# Patient Record
Sex: MALE | Race: WHITE | NOT HISPANIC OR LATINO | URBAN - METROPOLITAN AREA
[De-identification: names, ages, dates, MRNs, and addresses within clinical notes are randomized per-mention and may not be internally consistent; named-entity substitution may affect disease eponyms.]

---

## 2019-01-18 ENCOUNTER — OFFICE (OUTPATIENT)
Dept: URBAN - METROPOLITAN AREA CLINIC 64 | Facility: CLINIC | Age: 44
End: 2019-01-18

## 2019-01-18 VITALS
SYSTOLIC BLOOD PRESSURE: 165 MMHG | HEIGHT: 72 IN | HEART RATE: 82 BPM | WEIGHT: 172 LBS | DIASTOLIC BLOOD PRESSURE: 112 MMHG

## 2019-01-18 DIAGNOSIS — K59.00 CONSTIPATION, UNSPECIFIED: ICD-10-CM

## 2019-01-18 DIAGNOSIS — F10.10 ALCOHOL ABUSE, UNCOMPLICATED: ICD-10-CM

## 2019-01-18 DIAGNOSIS — K21.9 GASTRO-ESOPHAGEAL REFLUX DISEASE WITHOUT ESOPHAGITIS: ICD-10-CM

## 2019-01-18 DIAGNOSIS — R07.2 PRECORDIAL PAIN: ICD-10-CM

## 2019-01-18 DIAGNOSIS — K85.20 ALCOHOL INDUCED ACUTE PANCREATITIS WITHOUT NECROSIS OR INFEC: ICD-10-CM

## 2019-01-18 DIAGNOSIS — K25.9 GASTRIC ULCER, UNSPECIFIED AS ACUTE OR CHRONIC, WITHOUT HEMO: ICD-10-CM

## 2019-01-18 DIAGNOSIS — R11.2 NAUSEA WITH VOMITING, UNSPECIFIED: ICD-10-CM

## 2019-01-18 PROCEDURE — 99203 OFFICE O/P NEW LOW 30 MIN: CPT | Performed by: NURSE PRACTITIONER

## 2019-01-18 RX ORDER — RANITIDINE 150 MG/1
TABLET ORAL
Qty: 60 | Status: COMPLETED
End: 2019-01-18

## 2019-01-18 RX ORDER — ESCITALOPRAM 10 MG/1
TABLET, FILM COATED ORAL
Qty: 30 | Status: COMPLETED
End: 2019-01-18

## 2019-01-18 RX ORDER — SUCRALFATE 1 G/1
2 TABLET ORAL
Qty: 180 | Refills: 3 | Status: ACTIVE
Start: 2019-01-18

## 2019-01-18 RX ORDER — DOCUSATE SODIUM 100 MG/1
100 CAPSULE ORAL
Qty: 60 | Refills: 11 | Status: ACTIVE
Start: 2019-01-18

## 2019-01-18 RX ORDER — SUCRALFATE 1 G/1
TABLET ORAL
Qty: 60 | Status: COMPLETED
End: 2019-01-18

## 2019-01-18 RX ORDER — PANTOPRAZOLE SODIUM 40 MG/1
40 TABLET, DELAYED RELEASE ORAL
Qty: 90 | Refills: 3 | Status: COMPLETED
Start: 2019-01-18 | End: 2019-05-31

## 2019-03-15 ENCOUNTER — OFFICE (OUTPATIENT)
Dept: URBAN - METROPOLITAN AREA CLINIC 64 | Facility: CLINIC | Age: 44
End: 2019-03-15

## 2019-03-15 VITALS
HEART RATE: 96 BPM | DIASTOLIC BLOOD PRESSURE: 118 MMHG | WEIGHT: 180 LBS | HEIGHT: 72 IN | SYSTOLIC BLOOD PRESSURE: 158 MMHG

## 2019-03-15 DIAGNOSIS — R11.2 NAUSEA WITH VOMITING, UNSPECIFIED: ICD-10-CM

## 2019-03-15 DIAGNOSIS — R07.9 CHEST PAIN, UNSPECIFIED: ICD-10-CM

## 2019-03-15 DIAGNOSIS — K85.20 ALCOHOL INDUCED ACUTE PANCREATITIS WITHOUT NECROSIS OR INFEC: ICD-10-CM

## 2019-03-15 DIAGNOSIS — K21.9 GASTRO-ESOPHAGEAL REFLUX DISEASE WITHOUT ESOPHAGITIS: ICD-10-CM

## 2019-03-15 PROCEDURE — 99213 OFFICE O/P EST LOW 20 MIN: CPT | Performed by: NURSE PRACTITIONER

## 2019-03-15 RX ORDER — ESOMEPRAZOLE MAGNESIUM 40 MG/1
CAPSULE, DELAYED RELEASE ORAL
Qty: 90 | Refills: 0 | Status: COMPLETED
End: 2019-03-15

## 2019-05-31 ENCOUNTER — OFFICE (OUTPATIENT)
Dept: URBAN - METROPOLITAN AREA CLINIC 64 | Facility: CLINIC | Age: 44
End: 2019-05-31

## 2019-05-31 VITALS
SYSTOLIC BLOOD PRESSURE: 89 MMHG | DIASTOLIC BLOOD PRESSURE: 50 MMHG | WEIGHT: 177 LBS | HEART RATE: 83 BPM | HEIGHT: 72 IN

## 2019-05-31 DIAGNOSIS — R11.2 NAUSEA WITH VOMITING, UNSPECIFIED: ICD-10-CM

## 2019-05-31 DIAGNOSIS — R07.9 CHEST PAIN, UNSPECIFIED: ICD-10-CM

## 2019-05-31 DIAGNOSIS — K21.9 GASTRO-ESOPHAGEAL REFLUX DISEASE WITHOUT ESOPHAGITIS: ICD-10-CM

## 2019-05-31 PROCEDURE — 99214 OFFICE O/P EST MOD 30 MIN: CPT | Performed by: NURSE PRACTITIONER

## 2019-05-31 RX ORDER — DEXLANSOPRAZOLE 60 MG/1
60 CAPSULE, DELAYED RELEASE ORAL
Qty: 90 | Refills: 3 | Status: ACTIVE
Start: 2019-05-31

## 2019-05-31 RX ORDER — PANTOPRAZOLE SODIUM 40 MG/1
40 TABLET, DELAYED RELEASE ORAL
Qty: 90 | Refills: 3 | Status: COMPLETED
Start: 2019-01-18 | End: 2019-05-31

## 2019-05-31 RX ORDER — SUCRALFATE 1 G/1
2 TABLET ORAL
Qty: 180 | Refills: 3 | Status: ACTIVE
Start: 2019-01-18

## 2019-07-08 ENCOUNTER — INPATIENT HOSPITAL (OUTPATIENT)
Dept: URBAN - METROPOLITAN AREA HOSPITAL 76 | Facility: HOSPITAL | Age: 44
End: 2019-07-08

## 2019-07-08 DIAGNOSIS — R10.10 UPPER ABDOMINAL PAIN, UNSPECIFIED: ICD-10-CM

## 2019-07-08 DIAGNOSIS — F10.10 ALCOHOL ABUSE, UNCOMPLICATED: ICD-10-CM

## 2019-07-08 DIAGNOSIS — D72.829 ELEVATED WHITE BLOOD CELL COUNT, UNSPECIFIED: ICD-10-CM

## 2019-07-08 DIAGNOSIS — R11.2 NAUSEA WITH VOMITING, UNSPECIFIED: ICD-10-CM

## 2019-07-08 DIAGNOSIS — K85.90 ACUTE PANCREATITIS WITHOUT NECROSIS OR INFECTION, UNSPECIFIE: ICD-10-CM

## 2019-07-08 PROCEDURE — 99222 1ST HOSP IP/OBS MODERATE 55: CPT | Performed by: NURSE PRACTITIONER

## 2019-07-09 PROCEDURE — 99232 SBSQ HOSP IP/OBS MODERATE 35: CPT | Performed by: NURSE PRACTITIONER

## 2019-07-10 ENCOUNTER — INPATIENT HOSPITAL (OUTPATIENT)
Dept: URBAN - METROPOLITAN AREA HOSPITAL 76 | Facility: HOSPITAL | Age: 44
End: 2019-07-10

## 2019-07-10 DIAGNOSIS — D72.829 ELEVATED WHITE BLOOD CELL COUNT, UNSPECIFIED: ICD-10-CM

## 2019-07-10 DIAGNOSIS — R11.2 NAUSEA WITH VOMITING, UNSPECIFIED: ICD-10-CM

## 2019-07-10 DIAGNOSIS — F10.188 ALCOHOL ABUSE WITH OTHER ALCOHOL-INDUCED DISORDER: ICD-10-CM

## 2019-07-10 DIAGNOSIS — K85.20 ALCOHOL INDUCED ACUTE PANCREATITIS WITHOUT NECROSIS OR INFEC: ICD-10-CM

## 2019-07-10 DIAGNOSIS — R10.10 UPPER ABDOMINAL PAIN, UNSPECIFIED: ICD-10-CM

## 2019-07-10 PROCEDURE — 99231 SBSQ HOSP IP/OBS SF/LOW 25: CPT | Performed by: NURSE PRACTITIONER

## 2019-10-18 ENCOUNTER — OFFICE (OUTPATIENT)
Dept: URBAN - METROPOLITAN AREA CLINIC 64 | Facility: CLINIC | Age: 44
End: 2019-10-18
Payer: COMMERCIAL

## 2019-10-18 VITALS
WEIGHT: 183 LBS | DIASTOLIC BLOOD PRESSURE: 93 MMHG | SYSTOLIC BLOOD PRESSURE: 136 MMHG | HEART RATE: 92 BPM | HEIGHT: 72 IN

## 2019-10-18 DIAGNOSIS — K21.9 GASTRO-ESOPHAGEAL REFLUX DISEASE WITHOUT ESOPHAGITIS: ICD-10-CM

## 2019-10-18 DIAGNOSIS — F10.10 ALCOHOL ABUSE, UNCOMPLICATED: ICD-10-CM

## 2019-10-18 DIAGNOSIS — K85.20 ALCOHOL INDUCED ACUTE PANCREATITIS WITHOUT NECROSIS OR INFEC: ICD-10-CM

## 2019-10-18 PROCEDURE — 99214 OFFICE O/P EST MOD 30 MIN: CPT | Performed by: NURSE PRACTITIONER

## 2019-10-18 RX ORDER — SUCRALFATE 1 G/1
2 TABLET ORAL
Qty: 180 | Refills: 3 | Status: ACTIVE
Start: 2019-01-18

## 2019-10-18 RX ORDER — ESOMEPRAZOLE MAGNESIUM 40 MG/1
40 CAPSULE, DELAYED RELEASE ORAL
Qty: 90 | Refills: 5 | Status: COMPLETED
End: 2021-02-03

## 2019-10-18 RX ORDER — DEXLANSOPRAZOLE 60 MG/1
60 CAPSULE, DELAYED RELEASE ORAL
Qty: 90 | Refills: 3 | Status: ACTIVE
Start: 2019-05-31

## 2020-02-21 ENCOUNTER — OFFICE (OUTPATIENT)
Dept: URBAN - METROPOLITAN AREA CLINIC 64 | Facility: CLINIC | Age: 45
End: 2020-02-21

## 2020-02-21 VITALS
HEIGHT: 72 IN | WEIGHT: 183 LBS | HEART RATE: 95 BPM | DIASTOLIC BLOOD PRESSURE: 83 MMHG | SYSTOLIC BLOOD PRESSURE: 118 MMHG

## 2020-02-21 DIAGNOSIS — R10.10 UPPER ABDOMINAL PAIN, UNSPECIFIED: ICD-10-CM

## 2020-02-21 DIAGNOSIS — F10.10 ALCOHOL ABUSE, UNCOMPLICATED: ICD-10-CM

## 2020-02-21 DIAGNOSIS — D50.9 IRON DEFICIENCY ANEMIA, UNSPECIFIED: ICD-10-CM

## 2020-02-21 DIAGNOSIS — K21.9 GASTRO-ESOPHAGEAL REFLUX DISEASE WITHOUT ESOPHAGITIS: ICD-10-CM

## 2020-02-21 DIAGNOSIS — R11.2 NAUSEA WITH VOMITING, UNSPECIFIED: ICD-10-CM

## 2020-02-21 PROCEDURE — 99213 OFFICE O/P EST LOW 20 MIN: CPT | Performed by: NURSE PRACTITIONER

## 2020-02-21 RX ORDER — ONDANSETRON HYDROCHLORIDE 4 MG/1
TABLET, FILM COATED ORAL
Qty: 21 | Refills: 3 | Status: ACTIVE

## 2020-03-17 ENCOUNTER — INPATIENT HOSPITAL (OUTPATIENT)
Dept: URBAN - METROPOLITAN AREA HOSPITAL 76 | Facility: HOSPITAL | Age: 45
End: 2020-03-17
Payer: MEDICAID

## 2020-03-17 ENCOUNTER — INPATIENT HOSPITAL (OUTPATIENT)
Dept: URBAN - METROPOLITAN AREA HOSPITAL 76 | Facility: HOSPITAL | Age: 45
End: 2020-03-17

## 2020-03-17 DIAGNOSIS — K20.9 ESOPHAGITIS, UNSPECIFIED: ICD-10-CM

## 2020-03-17 DIAGNOSIS — D72.829 ELEVATED WHITE BLOOD CELL COUNT, UNSPECIFIED: ICD-10-CM

## 2020-03-17 DIAGNOSIS — K31.5 OBSTRUCTION OF DUODENUM: ICD-10-CM

## 2020-03-17 DIAGNOSIS — K25.9 GASTRIC ULCER, UNSPECIFIED AS ACUTE OR CHRONIC, WITHOUT HEMO: ICD-10-CM

## 2020-03-17 DIAGNOSIS — D50.9 IRON DEFICIENCY ANEMIA, UNSPECIFIED: ICD-10-CM

## 2020-03-17 DIAGNOSIS — D50.0 IRON DEFICIENCY ANEMIA SECONDARY TO BLOOD LOSS (CHRONIC): ICD-10-CM

## 2020-03-17 DIAGNOSIS — K29.50 UNSPECIFIED CHRONIC GASTRITIS WITHOUT BLEEDING: ICD-10-CM

## 2020-03-17 DIAGNOSIS — R94.5 ABNORMAL RESULTS OF LIVER FUNCTION STUDIES: ICD-10-CM

## 2020-03-17 DIAGNOSIS — K85.91 ACUTE PANCREATITIS WITH UNINFECTED NECROSIS, UNSPECIFIED: ICD-10-CM

## 2020-03-17 DIAGNOSIS — R10.12 LEFT UPPER QUADRANT PAIN: ICD-10-CM

## 2020-03-17 PROCEDURE — 99222 1ST HOSP IP/OBS MODERATE 55: CPT | Mod: 25 | Performed by: NURSE PRACTITIONER

## 2020-03-17 PROCEDURE — 43239 EGD BIOPSY SINGLE/MULTIPLE: CPT | Performed by: INTERNAL MEDICINE

## 2020-03-18 PROCEDURE — 99232 SBSQ HOSP IP/OBS MODERATE 35: CPT | Performed by: NURSE PRACTITIONER

## 2020-04-15 ENCOUNTER — INPATIENT HOSPITAL (OUTPATIENT)
Dept: URBAN - METROPOLITAN AREA HOSPITAL 76 | Facility: HOSPITAL | Age: 45
End: 2020-04-15

## 2020-04-15 DIAGNOSIS — R74.8 ABNORMAL LEVELS OF OTHER SERUM ENZYMES: ICD-10-CM

## 2020-04-15 DIAGNOSIS — I81 PORTAL VEIN THROMBOSIS: ICD-10-CM

## 2020-04-15 DIAGNOSIS — R10.13 EPIGASTRIC PAIN: ICD-10-CM

## 2020-04-15 DIAGNOSIS — K85.20 ALCOHOL INDUCED ACUTE PANCREATITIS WITHOUT NECROSIS OR INFEC: ICD-10-CM

## 2020-04-15 PROCEDURE — 99222 1ST HOSP IP/OBS MODERATE 55: CPT | Performed by: INTERNAL MEDICINE

## 2020-04-16 PROCEDURE — 99232 SBSQ HOSP IP/OBS MODERATE 35: CPT | Performed by: INTERNAL MEDICINE

## 2020-04-17 PROCEDURE — 99231 SBSQ HOSP IP/OBS SF/LOW 25: CPT | Performed by: INTERNAL MEDICINE

## 2020-04-18 PROCEDURE — 99231 SBSQ HOSP IP/OBS SF/LOW 25: CPT | Performed by: INTERNAL MEDICINE

## 2020-06-05 ENCOUNTER — INPATIENT HOSPITAL (OUTPATIENT)
Dept: URBAN - METROPOLITAN AREA HOSPITAL 76 | Facility: HOSPITAL | Age: 45
End: 2020-06-05

## 2020-06-05 DIAGNOSIS — F17.210 NICOTINE DEPENDENCE, CIGARETTES, UNCOMPLICATED: ICD-10-CM

## 2020-06-05 DIAGNOSIS — R11.2 NAUSEA WITH VOMITING, UNSPECIFIED: ICD-10-CM

## 2020-06-05 DIAGNOSIS — K70.11 ALCOHOLIC HEPATITIS WITH ASCITES: ICD-10-CM

## 2020-06-05 DIAGNOSIS — E87.6 HYPOKALEMIA: ICD-10-CM

## 2020-06-05 DIAGNOSIS — R94.5 ABNORMAL RESULTS OF LIVER FUNCTION STUDIES: ICD-10-CM

## 2020-06-05 DIAGNOSIS — F31.30: ICD-10-CM

## 2020-06-05 DIAGNOSIS — F10.239 ALCOHOL DEPENDENCE WITH WITHDRAWAL, UNSPECIFIED: ICD-10-CM

## 2020-06-05 DIAGNOSIS — K86.0 ALCOHOL-INDUCED CHRONIC PANCREATITIS: ICD-10-CM

## 2020-06-05 DIAGNOSIS — Z90.49 ACQUIRED ABSENCE OF OTHER SPECIFIED PARTS OF DIGESTIVE TRACT: ICD-10-CM

## 2020-06-05 DIAGNOSIS — K85.20 ALCOHOL INDUCED ACUTE PANCREATITIS WITHOUT NECROSIS OR INFEC: ICD-10-CM

## 2020-06-05 PROCEDURE — 99222 1ST HOSP IP/OBS MODERATE 55: CPT | Performed by: NURSE PRACTITIONER

## 2020-06-17 ENCOUNTER — INPATIENT HOSPITAL (OUTPATIENT)
Dept: URBAN - METROPOLITAN AREA HOSPITAL 76 | Facility: HOSPITAL | Age: 45
End: 2020-06-17

## 2020-06-17 DIAGNOSIS — K31.5 OBSTRUCTION OF DUODENUM: ICD-10-CM

## 2020-06-17 DIAGNOSIS — K86.1 OTHER CHRONIC PANCREATITIS: ICD-10-CM

## 2020-06-17 DIAGNOSIS — E46 UNSPECIFIED PROTEIN-CALORIE MALNUTRITION: ICD-10-CM

## 2020-06-17 DIAGNOSIS — F10.239 ALCOHOL DEPENDENCE WITH WITHDRAWAL, UNSPECIFIED: ICD-10-CM

## 2020-06-17 PROCEDURE — 43241 EGD TUBE/CATH INSERTION: CPT | Performed by: INTERNAL MEDICINE

## 2020-07-08 ENCOUNTER — INPATIENT HOSPITAL (OUTPATIENT)
Dept: URBAN - METROPOLITAN AREA HOSPITAL 76 | Facility: HOSPITAL | Age: 45
End: 2020-07-08

## 2020-07-08 DIAGNOSIS — F10.10 ALCOHOL ABUSE, UNCOMPLICATED: ICD-10-CM

## 2020-07-08 DIAGNOSIS — K86.1 OTHER CHRONIC PANCREATITIS: ICD-10-CM

## 2020-07-08 DIAGNOSIS — R94.5 ABNORMAL RESULTS OF LIVER FUNCTION STUDIES: ICD-10-CM

## 2020-07-08 DIAGNOSIS — R10.9 UNSPECIFIED ABDOMINAL PAIN: ICD-10-CM

## 2020-07-08 DIAGNOSIS — K85.90 ACUTE PANCREATITIS WITHOUT NECROSIS OR INFECTION, UNSPECIFIE: ICD-10-CM

## 2020-07-08 DIAGNOSIS — R11.2 NAUSEA WITH VOMITING, UNSPECIFIED: ICD-10-CM

## 2020-07-08 PROCEDURE — 99223 1ST HOSP IP/OBS HIGH 75: CPT | Performed by: NURSE PRACTITIONER

## 2020-07-09 PROCEDURE — 99232 SBSQ HOSP IP/OBS MODERATE 35: CPT | Performed by: NURSE PRACTITIONER

## 2020-07-10 ENCOUNTER — INPATIENT HOSPITAL (OUTPATIENT)
Dept: URBAN - METROPOLITAN AREA HOSPITAL 76 | Facility: HOSPITAL | Age: 45
End: 2020-07-10

## 2020-07-10 DIAGNOSIS — I86.4 GASTRIC VARICES: ICD-10-CM

## 2020-07-10 DIAGNOSIS — K92.1 MELENA: ICD-10-CM

## 2020-07-10 DIAGNOSIS — R93.3 ABNORMAL FINDINGS ON DIAGNOSTIC IMAGING OF OTHER PARTS OF DI: ICD-10-CM

## 2020-07-10 PROCEDURE — 43235 EGD DIAGNOSTIC BRUSH WASH: CPT | Performed by: INTERNAL MEDICINE

## 2020-08-28 ENCOUNTER — INPATIENT HOSPITAL (OUTPATIENT)
Dept: URBAN - METROPOLITAN AREA HOSPITAL 76 | Facility: HOSPITAL | Age: 45
End: 2020-08-28

## 2020-08-28 DIAGNOSIS — D50.0 IRON DEFICIENCY ANEMIA SECONDARY TO BLOOD LOSS (CHRONIC): ICD-10-CM

## 2020-08-28 DIAGNOSIS — K29.50 UNSPECIFIED CHRONIC GASTRITIS WITHOUT BLEEDING: ICD-10-CM

## 2020-08-28 DIAGNOSIS — R10.13 EPIGASTRIC PAIN: ICD-10-CM

## 2020-08-28 PROCEDURE — 43239 EGD BIOPSY SINGLE/MULTIPLE: CPT | Performed by: INTERNAL MEDICINE

## 2020-08-29 ENCOUNTER — INPATIENT HOSPITAL (OUTPATIENT)
Dept: URBAN - METROPOLITAN AREA HOSPITAL 76 | Facility: HOSPITAL | Age: 45
End: 2020-08-29

## 2020-08-29 DIAGNOSIS — K64.4 RESIDUAL HEMORRHOIDAL SKIN TAGS: ICD-10-CM

## 2020-08-29 DIAGNOSIS — R10.10 UPPER ABDOMINAL PAIN, UNSPECIFIED: ICD-10-CM

## 2020-08-29 DIAGNOSIS — F19.188: ICD-10-CM

## 2020-08-29 DIAGNOSIS — K62.5 HEMORRHAGE OF ANUS AND RECTUM: ICD-10-CM

## 2020-08-29 DIAGNOSIS — F10.188 ALCOHOL ABUSE WITH OTHER ALCOHOL-INDUCED DISORDER: ICD-10-CM

## 2020-08-29 DIAGNOSIS — K64.8 OTHER HEMORRHOIDS: ICD-10-CM

## 2020-08-29 DIAGNOSIS — K57.30 DIVERTICULOSIS OF LARGE INTESTINE WITHOUT PERFORATION OR ABS: ICD-10-CM

## 2020-08-29 DIAGNOSIS — D12.3 BENIGN NEOPLASM OF TRANSVERSE COLON: ICD-10-CM

## 2020-08-29 PROCEDURE — 45385 COLONOSCOPY W/LESION REMOVAL: CPT | Performed by: INTERNAL MEDICINE

## 2020-08-30 PROCEDURE — 99232 SBSQ HOSP IP/OBS MODERATE 35: CPT | Performed by: INTERNAL MEDICINE

## 2020-09-15 ENCOUNTER — INPATIENT HOSPITAL (OUTPATIENT)
Dept: URBAN - METROPOLITAN AREA HOSPITAL 76 | Facility: HOSPITAL | Age: 45
End: 2020-09-15

## 2020-09-15 DIAGNOSIS — K86.1 OTHER CHRONIC PANCREATITIS: ICD-10-CM

## 2020-09-15 DIAGNOSIS — F19.10 OTHER PSYCHOACTIVE SUBSTANCE ABUSE, UNCOMPLICATED: ICD-10-CM

## 2020-09-15 DIAGNOSIS — R94.5 ABNORMAL RESULTS OF LIVER FUNCTION STUDIES: ICD-10-CM

## 2020-09-15 DIAGNOSIS — D50.9 IRON DEFICIENCY ANEMIA, UNSPECIFIED: ICD-10-CM

## 2020-09-15 DIAGNOSIS — R93.3 ABNORMAL FINDINGS ON DIAGNOSTIC IMAGING OF OTHER PARTS OF DI: ICD-10-CM

## 2020-09-15 DIAGNOSIS — F10.10 ALCOHOL ABUSE, UNCOMPLICATED: ICD-10-CM

## 2020-09-15 DIAGNOSIS — R11.2 NAUSEA WITH VOMITING, UNSPECIFIED: ICD-10-CM

## 2020-09-15 PROCEDURE — 99221 1ST HOSP IP/OBS SF/LOW 40: CPT | Performed by: INTERNAL MEDICINE

## 2020-09-16 ENCOUNTER — INPATIENT HOSPITAL (OUTPATIENT)
Dept: URBAN - METROPOLITAN AREA HOSPITAL 76 | Facility: HOSPITAL | Age: 45
End: 2020-09-16
Payer: COMMERCIAL

## 2020-09-16 DIAGNOSIS — F10.10 ALCOHOL ABUSE, UNCOMPLICATED: ICD-10-CM

## 2020-09-16 DIAGNOSIS — D50.9 IRON DEFICIENCY ANEMIA, UNSPECIFIED: ICD-10-CM

## 2020-09-16 DIAGNOSIS — R19.7 DIARRHEA, UNSPECIFIED: ICD-10-CM

## 2020-09-16 DIAGNOSIS — R10.13 EPIGASTRIC PAIN: ICD-10-CM

## 2020-09-16 DIAGNOSIS — R74.8 ABNORMAL LEVELS OF OTHER SERUM ENZYMES: ICD-10-CM

## 2020-09-16 DIAGNOSIS — R10.12 LEFT UPPER QUADRANT PAIN: ICD-10-CM

## 2020-09-16 DIAGNOSIS — R11.2 NAUSEA WITH VOMITING, UNSPECIFIED: ICD-10-CM

## 2020-09-16 PROCEDURE — 99221 1ST HOSP IP/OBS SF/LOW 40: CPT | Performed by: INTERNAL MEDICINE

## 2021-02-03 ENCOUNTER — OFFICE (OUTPATIENT)
Dept: URBAN - METROPOLITAN AREA CLINIC 64 | Facility: CLINIC | Age: 46
End: 2021-02-03

## 2021-02-03 VITALS
SYSTOLIC BLOOD PRESSURE: 97 MMHG | HEIGHT: 72 IN | WEIGHT: 139 LBS | HEART RATE: 95 BPM | DIASTOLIC BLOOD PRESSURE: 74 MMHG

## 2021-02-03 DIAGNOSIS — D75.89 OTHER SPECIFIED DISEASES OF BLOOD AND BLOOD-FORMING ORGANS: ICD-10-CM

## 2021-02-03 DIAGNOSIS — K21.9 GASTRO-ESOPHAGEAL REFLUX DISEASE WITHOUT ESOPHAGITIS: ICD-10-CM

## 2021-02-03 DIAGNOSIS — R74.8 ABNORMAL LEVELS OF OTHER SERUM ENZYMES: ICD-10-CM

## 2021-02-03 DIAGNOSIS — D50.9 IRON DEFICIENCY ANEMIA, UNSPECIFIED: ICD-10-CM

## 2021-02-03 DIAGNOSIS — K85.20 ALCOHOL INDUCED ACUTE PANCREATITIS WITHOUT NECROSIS OR INFEC: ICD-10-CM

## 2021-02-03 PROCEDURE — 99214 OFFICE O/P EST MOD 30 MIN: CPT | Performed by: NURSE PRACTITIONER

## 2021-02-03 RX ORDER — SUCRALFATE 1 G/1
2 TABLET ORAL
Qty: 180 | Refills: 3 | Status: ACTIVE
Start: 2019-01-18

## 2021-02-03 RX ORDER — DEXLANSOPRAZOLE 60 MG/1
60 CAPSULE, DELAYED RELEASE ORAL
Qty: 90 | Refills: 3 | Status: ACTIVE
Start: 2019-05-31

## 2021-02-13 ENCOUNTER — ON CAMPUS - OUTPATIENT (OUTPATIENT)
Dept: URBAN - METROPOLITAN AREA HOSPITAL 77 | Facility: HOSPITAL | Age: 46
End: 2021-02-13
Payer: MEDICAID

## 2021-02-13 DIAGNOSIS — R10.13 EPIGASTRIC PAIN: ICD-10-CM

## 2021-02-13 DIAGNOSIS — F10.10 ALCOHOL ABUSE, UNCOMPLICATED: ICD-10-CM

## 2021-02-13 DIAGNOSIS — Z87.11 PERSONAL HISTORY OF PEPTIC ULCER DISEASE: ICD-10-CM

## 2021-02-13 DIAGNOSIS — D50.9 IRON DEFICIENCY ANEMIA, UNSPECIFIED: ICD-10-CM

## 2021-02-13 PROCEDURE — 99212 OFFICE O/P EST SF 10 MIN: CPT | Performed by: INTERNAL MEDICINE

## 2021-03-09 ENCOUNTER — ON CAMPUS - OUTPATIENT (OUTPATIENT)
Dept: URBAN - METROPOLITAN AREA HOSPITAL 77 | Facility: HOSPITAL | Age: 46
End: 2021-03-09

## 2021-03-09 DIAGNOSIS — D64.9 ANEMIA, UNSPECIFIED: ICD-10-CM

## 2021-03-09 DIAGNOSIS — K25.9 GASTRIC ULCER, UNSPECIFIED AS ACUTE OR CHRONIC, WITHOUT HEMO: ICD-10-CM

## 2021-03-09 DIAGNOSIS — K64.8 OTHER HEMORRHOIDS: ICD-10-CM

## 2021-03-09 DIAGNOSIS — K26.9 DUODENAL ULCER, UNSPECIFIED AS ACUTE OR CHRONIC, WITHOUT HEM: ICD-10-CM

## 2021-03-09 DIAGNOSIS — K57.90 DIVERTICULOSIS OF INTESTINE, PART UNSPECIFIED, WITHOUT PERFO: ICD-10-CM

## 2021-03-09 PROCEDURE — 45378 DIAGNOSTIC COLONOSCOPY: CPT | Performed by: INTERNAL MEDICINE

## 2021-03-09 PROCEDURE — 43239 EGD BIOPSY SINGLE/MULTIPLE: CPT | Performed by: INTERNAL MEDICINE

## 2024-01-23 ENCOUNTER — APPOINTMENT (OUTPATIENT)
Dept: GENERAL RADIOLOGY | Facility: HOSPITAL | Age: 49
DRG: 639 | End: 2024-01-23
Payer: MEDICARE

## 2024-01-23 ENCOUNTER — APPOINTMENT (OUTPATIENT)
Dept: CT IMAGING | Facility: HOSPITAL | Age: 49
DRG: 639 | End: 2024-01-23
Payer: MEDICARE

## 2024-01-23 ENCOUNTER — HOSPITAL ENCOUNTER (INPATIENT)
Facility: HOSPITAL | Age: 49
LOS: 2 days | Discharge: HOME | DRG: 639 | End: 2024-01-25
Attending: EMERGENCY MEDICINE | Admitting: INTERNAL MEDICINE
Payer: MEDICARE

## 2024-01-23 DIAGNOSIS — R73.9 HYPERGLYCEMIA: Primary | ICD-10-CM

## 2024-01-23 DIAGNOSIS — E86.0 DEHYDRATION: ICD-10-CM

## 2024-01-23 PROBLEM — E11.65 HYPERGLYCEMIA DUE TO DIABETES MELLITUS: Status: ACTIVE | Noted: 2024-01-23

## 2024-01-23 LAB
ACETONE BLD QL: NEGATIVE
ALBUMIN SERPL-MCNC: 4.2 G/DL (ref 3.5–5.2)
ALBUMIN/GLOB SERPL: 1.6 G/DL
ALP SERPL-CCNC: 169 U/L (ref 39–117)
ALT SERPL W P-5'-P-CCNC: 22 U/L (ref 1–41)
ANION GAP SERPL CALCULATED.3IONS-SCNC: 10 MMOL/L (ref 5–15)
AST SERPL-CCNC: 13 U/L (ref 1–40)
ATMOSPHERIC PRESS: ABNORMAL MM[HG]
BASE EXCESS BLDV CALC-SCNC: 1.5 MMOL/L (ref -2–2)
BASOPHILS # BLD AUTO: 0.1 10*3/MM3 (ref 0–0.2)
BASOPHILS NFR BLD AUTO: 1.2 % (ref 0–1.5)
BDY SITE: ABNORMAL
BILIRUB SERPL-MCNC: 0.4 MG/DL (ref 0–1.2)
BILIRUB UR QL STRIP: NEGATIVE
BUN SERPL-MCNC: 14 MG/DL (ref 6–20)
BUN/CREAT SERPL: 13.2 (ref 7–25)
CALCIUM SPEC-SCNC: 9.8 MG/DL (ref 8.6–10.5)
CHLORIDE SERPL-SCNC: 86 MMOL/L (ref 98–107)
CLARITY UR: CLEAR
CO2 BLDA-SCNC: 29.2 MMOL/L (ref 22–29)
CO2 SERPL-SCNC: 29 MMOL/L (ref 22–29)
COLOR UR: YELLOW
CREAT SERPL-MCNC: 1.06 MG/DL (ref 0.76–1.27)
DEPRECATED RDW RBC AUTO: 43.3 FL (ref 37–54)
EGFRCR SERPLBLD CKD-EPI 2021: 87.1 ML/MIN/1.73
EOSINOPHIL # BLD AUTO: 0.2 10*3/MM3 (ref 0–0.4)
EOSINOPHIL NFR BLD AUTO: 2.1 % (ref 0.3–6.2)
ERYTHROCYTE [DISTWIDTH] IN BLOOD BY AUTOMATED COUNT: 13 % (ref 12.3–15.4)
GLOBULIN UR ELPH-MCNC: 2.7 GM/DL
GLUCOSE BLDC GLUCOMTR-MCNC: 249 MG/DL (ref 70–105)
GLUCOSE BLDC GLUCOMTR-MCNC: 254 MG/DL (ref 70–105)
GLUCOSE BLDC GLUCOMTR-MCNC: 401 MG/DL (ref 70–105)
GLUCOSE BLDC GLUCOMTR-MCNC: >600 MG/DL (ref 70–105)
GLUCOSE SERPL-MCNC: 731 MG/DL (ref 65–99)
GLUCOSE UR STRIP-MCNC: ABNORMAL MG/DL
HBA1C MFR BLD: 16.5 % (ref 4.8–5.6)
HCO3 BLDV-SCNC: 27.7 MMOL/L (ref 22–26)
HCT VFR BLD AUTO: 45.3 % (ref 37.5–51)
HGB BLD-MCNC: 14.9 G/DL (ref 13–17.7)
HGB UR QL STRIP.AUTO: NEGATIVE
HOLD SPECIMEN: NORMAL
INHALED O2 CONCENTRATION: 21 %
KETONES UR QL STRIP: ABNORMAL
LEUKOCYTE ESTERASE UR QL STRIP.AUTO: NEGATIVE
LYMPHOCYTES # BLD AUTO: 1.9 10*3/MM3 (ref 0.7–3.1)
LYMPHOCYTES NFR BLD AUTO: 16.3 % (ref 19.6–45.3)
MAGNESIUM SERPL-MCNC: 1.9 MG/DL (ref 1.6–2.6)
MCH RBC QN AUTO: 29.8 PG (ref 26.6–33)
MCHC RBC AUTO-ENTMCNC: 32.8 G/DL (ref 31.5–35.7)
MCV RBC AUTO: 90.7 FL (ref 79–97)
MODALITY: ABNORMAL
MONOCYTES # BLD AUTO: 1.1 10*3/MM3 (ref 0.1–0.9)
MONOCYTES NFR BLD AUTO: 9.6 % (ref 5–12)
NEUTROPHILS NFR BLD AUTO: 70.8 % (ref 42.7–76)
NEUTROPHILS NFR BLD AUTO: 8.1 10*3/MM3 (ref 1.7–7)
NITRITE UR QL STRIP: NEGATIVE
NRBC BLD AUTO-RTO: 0 /100 WBC (ref 0–0.2)
PCO2 BLDV: 47.8 MM HG (ref 42–51)
PH BLDV: 7.37 PH UNITS (ref 7.32–7.43)
PH UR STRIP.AUTO: 7 [PH] (ref 5–8)
PLATELET # BLD AUTO: 195 10*3/MM3 (ref 140–450)
PMV BLD AUTO: 11.3 FL (ref 6–12)
PO2 BLDV: 35 MM HG (ref 40–42)
POTASSIUM SERPL-SCNC: 5 MMOL/L (ref 3.5–5.2)
PROT SERPL-MCNC: 6.9 G/DL (ref 6–8.5)
PROT UR QL STRIP: NEGATIVE
RBC # BLD AUTO: 5 10*6/MM3 (ref 4.14–5.8)
SAO2 % BLDCOV: 64.7 % (ref 45–75)
SODIUM SERPL-SCNC: 125 MMOL/L (ref 136–145)
SP GR UR STRIP: 1.03 (ref 1–1.03)
TSH SERPL DL<=0.05 MIU/L-ACNC: 3.76 UIU/ML (ref 0.27–4.2)
UROBILINOGEN UR QL STRIP: ABNORMAL
WBC NRBC COR # BLD AUTO: 11.4 10*3/MM3 (ref 3.4–10.8)

## 2024-01-23 PROCEDURE — 83735 ASSAY OF MAGNESIUM: CPT | Performed by: NURSE PRACTITIONER

## 2024-01-23 PROCEDURE — 81003 URINALYSIS AUTO W/O SCOPE: CPT | Performed by: NURSE PRACTITIONER

## 2024-01-23 PROCEDURE — 83036 HEMOGLOBIN GLYCOSYLATED A1C: CPT | Performed by: PHYSICIAN ASSISTANT

## 2024-01-23 PROCEDURE — 82803 BLOOD GASES ANY COMBINATION: CPT | Performed by: NURSE PRACTITIONER

## 2024-01-23 PROCEDURE — 63710000001 INSULIN LISPRO (HUMAN) PER 5 UNITS: Performed by: PHYSICIAN ASSISTANT

## 2024-01-23 PROCEDURE — 70450 CT HEAD/BRAIN W/O DYE: CPT

## 2024-01-23 PROCEDURE — 63710000001 INSULIN GLARGINE PER 5 UNITS: Performed by: PHYSICIAN ASSISTANT

## 2024-01-23 PROCEDURE — 80053 COMPREHEN METABOLIC PANEL: CPT | Performed by: NURSE PRACTITIONER

## 2024-01-23 PROCEDURE — 82948 REAGENT STRIP/BLOOD GLUCOSE: CPT

## 2024-01-23 PROCEDURE — 99285 EMERGENCY DEPT VISIT HI MDM: CPT

## 2024-01-23 PROCEDURE — 85025 COMPLETE CBC W/AUTO DIFF WBC: CPT | Performed by: NURSE PRACTITIONER

## 2024-01-23 PROCEDURE — 25810000003 SODIUM CHLORIDE 0.9 % SOLUTION: Performed by: EMERGENCY MEDICINE

## 2024-01-23 PROCEDURE — 84443 ASSAY THYROID STIM HORMONE: CPT | Performed by: NURSE PRACTITIONER

## 2024-01-23 PROCEDURE — 82009 KETONE BODYS QUAL: CPT | Performed by: NURSE PRACTITIONER

## 2024-01-23 PROCEDURE — 82948 REAGENT STRIP/BLOOD GLUCOSE: CPT | Performed by: PHYSICIAN ASSISTANT

## 2024-01-23 PROCEDURE — 25010000002 ENOXAPARIN PER 10 MG: Performed by: PHYSICIAN ASSISTANT

## 2024-01-23 PROCEDURE — 25810000003 SODIUM CHLORIDE 0.9 % SOLUTION: Performed by: PHYSICIAN ASSISTANT

## 2024-01-23 PROCEDURE — 63710000001 INSULIN LISPRO (HUMAN) PER 5 UNITS: Performed by: EMERGENCY MEDICINE

## 2024-01-23 PROCEDURE — 71046 X-RAY EXAM CHEST 2 VIEWS: CPT

## 2024-01-23 RX ORDER — SODIUM CHLORIDE 9 MG/ML
150 INJECTION, SOLUTION INTRAVENOUS CONTINUOUS
Status: DISCONTINUED | OUTPATIENT
Start: 2024-01-23 | End: 2024-01-24

## 2024-01-23 RX ORDER — ACETAMINOPHEN 325 MG/1
650 TABLET ORAL EVERY 4 HOURS PRN
Status: DISCONTINUED | OUTPATIENT
Start: 2024-01-23 | End: 2024-01-25 | Stop reason: HOSPADM

## 2024-01-23 RX ORDER — QUETIAPINE FUMARATE 100 MG/1
600 TABLET, FILM COATED ORAL NIGHTLY
Status: DISCONTINUED | OUTPATIENT
Start: 2024-01-23 | End: 2024-01-25 | Stop reason: HOSPADM

## 2024-01-23 RX ORDER — ALUMINA, MAGNESIA, AND SIMETHICONE 2400; 2400; 240 MG/30ML; MG/30ML; MG/30ML
15 SUSPENSION ORAL EVERY 6 HOURS PRN
Status: DISCONTINUED | OUTPATIENT
Start: 2024-01-23 | End: 2024-01-25 | Stop reason: HOSPADM

## 2024-01-23 RX ORDER — NICOTINE 21 MG/24HR
1 PATCH, TRANSDERMAL 24 HOURS TRANSDERMAL EVERY 24 HOURS
Status: DISCONTINUED | OUTPATIENT
Start: 2024-01-23 | End: 2024-01-25 | Stop reason: HOSPADM

## 2024-01-23 RX ORDER — GABAPENTIN 400 MG/1
800 CAPSULE ORAL 2 TIMES DAILY
COMMUNITY

## 2024-01-23 RX ORDER — QUETIAPINE FUMARATE 50 MG/1
50 TABLET, EXTENDED RELEASE ORAL EVERY MORNING
COMMUNITY

## 2024-01-23 RX ORDER — ESCITALOPRAM OXALATE 5 MG/1
5 TABLET ORAL DAILY
COMMUNITY

## 2024-01-23 RX ORDER — FERROUS SULFATE 324(65)MG
324 TABLET, DELAYED RELEASE (ENTERIC COATED) ORAL
Status: DISCONTINUED | OUTPATIENT
Start: 2024-01-24 | End: 2024-01-25 | Stop reason: HOSPADM

## 2024-01-23 RX ORDER — BISACODYL 10 MG
10 SUPPOSITORY, RECTAL RECTAL DAILY PRN
Status: DISCONTINUED | OUTPATIENT
Start: 2024-01-23 | End: 2024-01-25 | Stop reason: HOSPADM

## 2024-01-23 RX ORDER — ACETAMINOPHEN 160 MG/5ML
650 SOLUTION ORAL EVERY 4 HOURS PRN
Status: DISCONTINUED | OUTPATIENT
Start: 2024-01-23 | End: 2024-01-25 | Stop reason: HOSPADM

## 2024-01-23 RX ORDER — ARIPIPRAZOLE 20 MG/1
20 TABLET ORAL DAILY
COMMUNITY

## 2024-01-23 RX ORDER — IBUPROFEN 600 MG/1
1 TABLET ORAL
Status: DISCONTINUED | OUTPATIENT
Start: 2024-01-23 | End: 2024-01-23

## 2024-01-23 RX ORDER — GABAPENTIN 800 MG/1
800 TABLET ORAL 2 TIMES DAILY
COMMUNITY
End: 2024-01-23

## 2024-01-23 RX ORDER — FERROUS SULFATE 325(65) MG
325 TABLET ORAL DAILY
COMMUNITY

## 2024-01-23 RX ORDER — SODIUM CHLORIDE 0.9 % (FLUSH) 0.9 %
10 SYRINGE (ML) INJECTION AS NEEDED
Status: DISCONTINUED | OUTPATIENT
Start: 2024-01-23 | End: 2024-01-25 | Stop reason: HOSPADM

## 2024-01-23 RX ORDER — ATORVASTATIN CALCIUM 20 MG/1
20 TABLET, FILM COATED ORAL DAILY
Status: DISCONTINUED | OUTPATIENT
Start: 2024-01-24 | End: 2024-01-25 | Stop reason: HOSPADM

## 2024-01-23 RX ORDER — ESCITALOPRAM OXALATE 10 MG/1
5 TABLET ORAL DAILY
Status: DISCONTINUED | OUTPATIENT
Start: 2024-01-24 | End: 2024-01-25 | Stop reason: HOSPADM

## 2024-01-23 RX ORDER — ARIPIPRAZOLE 10 MG/1
20 TABLET ORAL DAILY
Status: DISCONTINUED | OUTPATIENT
Start: 2024-01-24 | End: 2024-01-25 | Stop reason: HOSPADM

## 2024-01-23 RX ORDER — INSULIN LISPRO 100 [IU]/ML
1-200 INJECTION, SOLUTION INTRAVENOUS; SUBCUTANEOUS
Status: DISCONTINUED | OUTPATIENT
Start: 2024-01-23 | End: 2024-01-23

## 2024-01-23 RX ORDER — INSULIN LISPRO 100 [IU]/ML
1-200 INJECTION, SOLUTION INTRAVENOUS; SUBCUTANEOUS AS NEEDED
Status: DISCONTINUED | OUTPATIENT
Start: 2024-01-23 | End: 2024-01-24

## 2024-01-23 RX ORDER — IBUPROFEN 600 MG/1
1 TABLET ORAL
Status: DISCONTINUED | OUTPATIENT
Start: 2024-01-23 | End: 2024-01-25 | Stop reason: HOSPADM

## 2024-01-23 RX ORDER — POLYETHYLENE GLYCOL 3350 17 G/17G
17 POWDER, FOR SOLUTION ORAL DAILY PRN
Status: DISCONTINUED | OUTPATIENT
Start: 2024-01-23 | End: 2024-01-25 | Stop reason: HOSPADM

## 2024-01-23 RX ORDER — SODIUM CHLORIDE 0.9 % (FLUSH) 0.9 %
10 SYRINGE (ML) INJECTION EVERY 12 HOURS SCHEDULED
Status: DISCONTINUED | OUTPATIENT
Start: 2024-01-23 | End: 2024-01-25 | Stop reason: HOSPADM

## 2024-01-23 RX ORDER — DEXLANSOPRAZOLE 30 MG/1
30 CAPSULE, DELAYED RELEASE ORAL DAILY
COMMUNITY

## 2024-01-23 RX ORDER — SODIUM CHLORIDE 9 MG/ML
40 INJECTION, SOLUTION INTRAVENOUS AS NEEDED
Status: DISCONTINUED | OUTPATIENT
Start: 2024-01-23 | End: 2024-01-25 | Stop reason: HOSPADM

## 2024-01-23 RX ORDER — NICOTINE POLACRILEX 4 MG
15 LOZENGE BUCCAL
Status: DISCONTINUED | OUTPATIENT
Start: 2024-01-23 | End: 2024-01-25 | Stop reason: HOSPADM

## 2024-01-23 RX ORDER — INSULIN LISPRO 100 [IU]/ML
5 INJECTION, SOLUTION INTRAVENOUS; SUBCUTANEOUS ONCE
Status: COMPLETED | OUTPATIENT
Start: 2024-01-23 | End: 2024-01-23

## 2024-01-23 RX ORDER — GABAPENTIN 400 MG/1
800 CAPSULE ORAL 2 TIMES DAILY
Status: DISCONTINUED | OUTPATIENT
Start: 2024-01-23 | End: 2024-01-25 | Stop reason: HOSPADM

## 2024-01-23 RX ORDER — PANTOPRAZOLE SODIUM 40 MG/1
40 TABLET, DELAYED RELEASE ORAL
Status: DISCONTINUED | OUTPATIENT
Start: 2024-01-24 | End: 2024-01-25 | Stop reason: HOSPADM

## 2024-01-23 RX ORDER — QUETIAPINE FUMARATE 50 MG/1
50 TABLET, EXTENDED RELEASE ORAL EVERY MORNING
Status: DISCONTINUED | OUTPATIENT
Start: 2024-01-24 | End: 2024-01-24

## 2024-01-23 RX ORDER — DEXTROSE MONOHYDRATE 25 G/50ML
10-50 INJECTION, SOLUTION INTRAVENOUS
Status: DISCONTINUED | OUTPATIENT
Start: 2024-01-23 | End: 2024-01-25 | Stop reason: HOSPADM

## 2024-01-23 RX ORDER — ENOXAPARIN SODIUM 100 MG/ML
40 INJECTION SUBCUTANEOUS DAILY
Status: DISCONTINUED | OUTPATIENT
Start: 2024-01-23 | End: 2024-01-25 | Stop reason: HOSPADM

## 2024-01-23 RX ORDER — NICOTINE POLACRILEX 4 MG
15 LOZENGE BUCCAL
Status: DISCONTINUED | OUTPATIENT
Start: 2024-01-23 | End: 2024-01-23

## 2024-01-23 RX ORDER — INSULIN LISPRO 100 [IU]/ML
1-200 INJECTION, SOLUTION INTRAVENOUS; SUBCUTANEOUS AS NEEDED
Status: DISCONTINUED | OUTPATIENT
Start: 2024-01-23 | End: 2024-01-23

## 2024-01-23 RX ORDER — ACETAMINOPHEN 650 MG/1
650 SUPPOSITORY RECTAL EVERY 4 HOURS PRN
Status: DISCONTINUED | OUTPATIENT
Start: 2024-01-23 | End: 2024-01-25 | Stop reason: HOSPADM

## 2024-01-23 RX ORDER — ONDANSETRON 4 MG/1
4 TABLET, ORALLY DISINTEGRATING ORAL EVERY 6 HOURS PRN
Status: DISCONTINUED | OUTPATIENT
Start: 2024-01-23 | End: 2024-01-25 | Stop reason: HOSPADM

## 2024-01-23 RX ORDER — CHOLECALCIFEROL (VITAMIN D3) 125 MCG
5 CAPSULE ORAL NIGHTLY PRN
Status: DISCONTINUED | OUTPATIENT
Start: 2024-01-23 | End: 2024-01-25 | Stop reason: HOSPADM

## 2024-01-23 RX ORDER — ATORVASTATIN CALCIUM 20 MG/1
20 TABLET, FILM COATED ORAL DAILY
COMMUNITY

## 2024-01-23 RX ORDER — DEXTROSE MONOHYDRATE 25 G/50ML
10-50 INJECTION, SOLUTION INTRAVENOUS
Status: DISCONTINUED | OUTPATIENT
Start: 2024-01-23 | End: 2024-01-23

## 2024-01-23 RX ORDER — ONDANSETRON 2 MG/ML
4 INJECTION INTRAMUSCULAR; INTRAVENOUS EVERY 6 HOURS PRN
Status: DISCONTINUED | OUTPATIENT
Start: 2024-01-23 | End: 2024-01-25 | Stop reason: HOSPADM

## 2024-01-23 RX ORDER — QUETIAPINE FUMARATE 300 MG/1
600 TABLET, FILM COATED ORAL NIGHTLY
COMMUNITY

## 2024-01-23 RX ORDER — INSULIN LISPRO 100 [IU]/ML
1-200 INJECTION, SOLUTION INTRAVENOUS; SUBCUTANEOUS EVERY 6 HOURS SCHEDULED
Status: DISCONTINUED | OUTPATIENT
Start: 2024-01-23 | End: 2024-01-24

## 2024-01-23 RX ORDER — BISACODYL 5 MG/1
5 TABLET, DELAYED RELEASE ORAL DAILY PRN
Status: DISCONTINUED | OUTPATIENT
Start: 2024-01-23 | End: 2024-01-25 | Stop reason: HOSPADM

## 2024-01-23 RX ADMIN — INSULIN LISPRO 2 UNITS: 100 INJECTION, SOLUTION INTRAVENOUS; SUBCUTANEOUS at 18:28

## 2024-01-23 RX ADMIN — SODIUM CHLORIDE 1000 ML: 9 INJECTION, SOLUTION INTRAVENOUS at 12:11

## 2024-01-23 RX ADMIN — Medication 1 PATCH: at 16:52

## 2024-01-23 RX ADMIN — SODIUM CHLORIDE 150 ML/HR: 9 INJECTION, SOLUTION INTRAVENOUS at 23:05

## 2024-01-23 RX ADMIN — INSULIN GLARGINE 15 UNITS: 100 INJECTION, SOLUTION SUBCUTANEOUS at 20:15

## 2024-01-23 RX ADMIN — GABAPENTIN 800 MG: 400 CAPSULE ORAL at 22:41

## 2024-01-23 RX ADMIN — SODIUM CHLORIDE 150 ML/HR: 9 INJECTION, SOLUTION INTRAVENOUS at 16:52

## 2024-01-23 RX ADMIN — INSULIN LISPRO 5 UNITS: 100 INJECTION, SOLUTION INTRAVENOUS; SUBCUTANEOUS at 12:11

## 2024-01-23 RX ADMIN — QUETIAPINE FUMARATE 600 MG: 100 TABLET ORAL at 22:50

## 2024-01-23 RX ADMIN — Medication 10 ML: at 20:17

## 2024-01-23 NOTE — ED PROVIDER NOTES
"Subjective   Provider in Triage Note  49-year-old male comes in with family stating he had labs drawn as an outpatient yesterday from his family doctor was told to come to the ER today for blood glucose of over 900.  States he has been on metformin for a year for \"prediabetes.\"  He reports polyuria and polydipsia with unexplained weight loss over the last couple days.    Due to significant overcrowding in the emergency department patient was initially seen and evaluated in triage.  Provider in triage recommended patient placement in the treatment area to initiate therapy and movement to an ER bed as soon as possible.   Orders placed; medications will be deferred to main provider per protocol.        History of Present Illness    Review of Systems   Endocrine: Positive for polydipsia and polyuria.       Past Medical History:   Diagnosis Date    Prediabetes     Substance abuse     Tobacco abuse        No Known Allergies    Past Surgical History:   Procedure Laterality Date    COLON RESECTION  2020       Family History   Problem Relation Age of Onset    Diabetes Father        Social History     Socioeconomic History    Marital status: Single   Tobacco Use    Smoking status: Every Day     Packs/day: 1     Types: Cigarettes    Smokeless tobacco: Never   Vaping Use    Vaping Use: Never used   Substance and Sexual Activity    Alcohol use: Yes     Comment: drinks a few times a year    Drug use: Yes     Types: Methamphetamines    Sexual activity: Defer           Objective   Physical Exam    SEE ER PROVIDER NOTE    Procedures           ED Course      SEE ER PROVIDER NOTE                                       Medical Decision Making  Problems Addressed:  Dehydration: complicated acute illness or injury  Hyperglycemia: complicated acute illness or injury    Amount and/or Complexity of Data Reviewed  Labs: ordered.    Risk  Prescription drug management.  Decision regarding hospitalization.      SEE ER PROVIDER NOTE  Final " diagnoses:   Hyperglycemia   Dehydration       ED Disposition  ED Disposition       ED Disposition   Decision to Admit    Condition   --    Comment   Level of Care: Telemetry [5]   Admitting Physician: BRIAN EVANS [[id number]]   Attending Physician: BRIAN EVANS [[id number]]                 Marla Mcgraw, DIPIKA  [address] [facility] [place] [address]  [phone]          Alexander Valladares MD  [address] [place] [address] [place] [address] [phone]    Schedule an appointment as soon as possible for a visit  As soon as available         Medication List        New Prescriptions      Accu-Chek Guide Me w/Device kit  Use 1 kit 3 (Three) Times a Day Before Meals.     Accu-Chek Guide test strip  Generic drug: glucose blood  Use to test blood sugar 3 (Three) Times a Day Before Meals. Use as instructed  Dx code: E11.65     Accu-Chek Softclix Lancets lancets  1 each by Other route 3 (Three) Times a Day Before Meals. Use as instructed  Dx code: E11.65     BD Pen Needle Nia U/F 32G X 4 MM misc  Generic drug: Insulin Pen Needle  Use 1 each 5 (Five) Times a Day. Dx code: E11.65     FreeStyle Raymond 3 [place] device  Use 1 each 4 (Four) Times a Day Before Meals & at Bedtime. Dx code: E11.65     FreeStyle Raymond 3 Sensor misc  Use 1 each 4 (Four) Times a Day Before Meals & at Bedtime. Dx code: E11.65     HumaLOG KwikPen 100 UNIT/ML solution pen-injector  Generic drug: Insulin Lispro (1 Unit Dial)  Inject 5 Units under the skin into the appropriate area as directed 3 (Three) Times a Day Before Meals. Dx code: E11.65     Lantus SoloStar 100 UNIT/ML injection pen  Generic drug: Insulin Glargine  Inject 15 Units under the skin into the appropriate area as directed 2 (Two) Times a Day. Dx code: E11.65            Stop      metFORMIN 500 MG tablet  Commonly known as: GLUCOPHAGE               Where to Get Your Medications        These medications were sent to [facility] [address] [place] [address]       Hours: Monday to Friday 7 AM to 7 PM Phone: [phone]   Accu-Chek Guide Me w/Device kit  Accu-Chek Guide test strip  Accu-Chek Softclix Lancets lancets  BD Pen Needle Nia U/F 32G X 4 MM misc  FreeStyle Raymond 3 [place] device  FreeStyle Raymond 3 Sensor misc  HumaLOG KwikPen 100 UNIT/ML solution pen-injector  Lantus SoloStar 100 UNIT/ML injection pen

## 2024-01-23 NOTE — H&P
"[facility] Medicine Services  History & Physical    Patient Name: Teofilo Mayers  : 1975  MRN: [id number]  Primary Care Physician:  Provider, No Known  Date of admission: 2024      Subjective      Chief Complaint: Was told by family doctor to come to the ED secondary to elevated glucose.    History of Present Illness: Teofilo Mayres is a 49 y.o. male with a PMH of \" prediabetes\", mood disorder, substance abuse, tobacco abuse who presented to [facility] on 2024 after receiving a call from his primary care provider to come to the ED for glucose levels greater than 900.  Patient states that he went to his primary care provider yesterday and had some blood work done.  He states over the last year he has been being treated for prediabetes with metformin.  He states he is compliant with medications.  Patient states he is not having any symptoms.  He denies any nausea or vomiting or abdomen pain.  He denies any fever, congestion or cough.  He denies having any chest pains or shortness of air.  He denies having any abdominal pain, constipation or diarrhea.  Denies any melena, hematochezia and hematuria.  He denies any lower extremity wounds.  He does report that he has intermittent numbness of his fourth and fifth digit that has been going on for the last couple of days.  Patient reports increasing thirst and urination.  He denies having any unilateral weakness, facial drooping, visual disturbance or slurred speech.  We have been asked to admit this patient for further evaluation and treatment.      In the emergency department, sodium 125 corrected 140, potassium 5.0.  Anion gap 10.0.  Creatinine 1.6.  Glucose upon arrival 731.  Magnesium 1.9.  Alk phos 169.  TSH 3.760.  White blood count 11.40.  Hemoglobin 14.9.  Platelets 195.  UA significant for glucose and trace ketones.  Acetone negative.    VBG pH 7.370.  pCO2 47.8.  pO2 35.0.  H CO3 27.7.    Review of Systems  12 point ROS reviewed and " negative except as mentioned above     Personal History     Past Medical History:   Diagnosis Date    Prediabetes     Substance abuse     Tobacco abuse        No past surgical history on file.    Family History: family history is not on file. Otherwise pertinent FHx was reviewed and not pertinent to current issue.    Social History:  reports that he has been smoking cigarettes. He has been smoking an average of 1 pack per day. He does not have any smokeless tobacco history on file. He reports current drug use. Drug: Methamphetamines.    Home Medications:  Prior to Admission Medications       None              Allergies:  No Known Allergies    Objective      Vitals:   Temp:  [98 °F (36.7 °C)] 98 °F (36.7 °C)  Heart Rate:  [] 85  Resp:  [17] 17  BP: ()/(69-77) 91/69  Body mass index is 17.85 kg/m².  Physical Exam  Constitutional:       General: He is not in acute distress.  HENT:      Head: Normocephalic and atraumatic.   Eyes:      Extraocular Movements: Extraocular movements intact.      Pupils: Pupils are equal, round, and reactive to light.   Cardiovascular:      Rate and Rhythm: Normal rate and regular rhythm.   Pulmonary:      Effort: Pulmonary effort is normal.      Breath sounds: Rhonchi present.   Abdominal:      General: Bowel sounds are normal.      Palpations: Abdomen is soft.      Tenderness: There is no abdominal tenderness.   Musculoskeletal:         General: Normal range of motion.      Right lower leg: No edema.      Left lower leg: No edema.   Skin:     General: Skin is warm and dry.   Neurological:      General: No focal deficit present.      Mental Status: He is alert and oriented to person, place, and time.   Psychiatric:         Mood and Affect: Mood normal.         Behavior: Behavior normal.         Diagnostic Data:  Lab Results (last 24 hours)       Procedure Component Value Units Date/Time    Blood Gas, Venous - [[id number]]  (Abnormal) Collected: 01/23/24 1056    Specimen: Venous  Blood Updated: 01/23/24 1416     Site CentralLine     pH, Venous 7.370 pH Units      pCO2, Venous 47.8 mm Hg      pO2, Venous 35.0 mm Hg      HCO3, Venous 27.7 mmol/L      Base Excess, Venous 1.5 mmol/L      Comment: Serial Number: [id number]:  [id number]        O2 Saturation, Venous 64.7 %      CO2 Content 29.2 mmol/L      Barometric Pressure for Blood Gas --     Comment: N/A        Modality Room Air     FIO2 21 %     POC Glucose Once [[id number]]  (Abnormal) Collected: 01/23/24 1331    Specimen: Blood Updated: 01/23/24 1332     Glucose 401 mg/dL      Comment: Serial Number: [id number]:  [id number]       [place] Draw [[id number]] Collected: 01/23/24 1008    Specimen: Blood Updated: 01/23/24 1300    Narrative:      The following orders were created for panel order [place] Draw.  Procedure                               Abnormality         Status                     ---------                               -----------         ------                     Gold Top - SST[[id number]]                                   Final result                 Please view results for these tests on the individual orders.    Gold Top - SST [[id number]] Collected: 01/23/24 1008    Specimen: Blood Updated: 01/23/24 1300     Extra Tube Hold for add-ons.     Comment: Auto resulted.       Ketone Bodies, Serum (Not performed at [place]) [[id number]]  (Normal) Collected: 01/23/24 1008    Specimen: Blood Updated: 01/23/24 1115    Narrative:      The following orders were created for panel order Ketone Bodies, Serum (Not performed at [place]).  Procedure                               Abnormality         Status                     ---------                               -----------         ------                     Acetone[[id number]]                      Normal              Final result                 Please view results for these tests on the individual orders.    Acetone [[id number]]  (Normal) Collected: 01/23/24 1008    Specimen: Blood  Updated: 01/23/24 1115     Acetone Negative    TSH [[id number]]  (Normal) Collected: 01/23/24 1008    Specimen: Blood Updated: 01/23/24 1057     TSH 3.760 uIU/mL     Comprehensive Metabolic Panel [[id number]]  (Abnormal) Collected: 01/23/24 1008    Specimen: Blood Updated: 01/23/24 1053     Glucose 731 mg/dL      BUN 14 mg/dL      Creatinine 1.06 mg/dL      Sodium 125 mmol/L      Potassium 5.0 mmol/L      Chloride 86 mmol/L      CO2 29.0 mmol/L      Calcium 9.8 mg/dL      Total Protein 6.9 g/dL      Albumin 4.2 g/dL      ALT (SGPT) 22 U/L      AST (SGOT) 13 U/L      Alkaline Phosphatase 169 U/L      Total Bilirubin 0.4 mg/dL      Globulin 2.7 gm/dL      A/G Ratio 1.6 g/dL      BUN/Creatinine Ratio 13.2     Anion Gap 10.0 mmol/L      eGFR 87.1 mL/min/1.73     Narrative:      GFR Normal >60  Chronic Kidney Disease <60  Kidney Failure <15      Magnesium [[id number]]  (Normal) Collected: 01/23/24 1008    Specimen: Blood Updated: 01/23/24 1053     Magnesium 1.9 mg/dL     Urinalysis With Microscopic If Indicated (No Culture) - Urine, Clean Catch [[id number]]  (Abnormal) Collected: 01/23/24 1030    Specimen: Urine, Clean Catch Updated: 01/23/24 1041     Color, UA Yellow     Appearance, UA Clear     pH, UA 7.0     Specific Gravity, UA 1.033     Glucose, UA >=1000 mg/dL (3+)     Ketones, UA Trace     Bilirubin, UA Negative     Blood, UA Negative     Protein, UA Negative     Leuk Esterase, UA Negative     Nitrite, UA Negative     Urobilinogen, UA 1.0 E.U./dL    Narrative:      Urine microscopic not indicated.    CBC & Differential [[id number]]  (Abnormal) Collected: 01/23/24 1008    Specimen: Blood Updated: 01/23/24 1023    Narrative:      The following orders were created for panel order CBC & Differential.  Procedure                               Abnormality         Status                     ---------                               -----------         ------                     CBC Auto Differential[[id number]]        Abnormal             Final result                 Please view results for these tests on the individual orders.    CBC Auto Differential [[id number]]  (Abnormal) Collected: 01/23/24 1008    Specimen: Blood Updated: 01/23/24 1023     WBC 11.40 10*3/mm3      RBC 5.00 10*6/mm3      Hemoglobin 14.9 g/dL      Hematocrit 45.3 %      MCV 90.7 fL      MCH 29.8 pg      MCHC 32.8 g/dL      RDW 13.0 %      RDW-SD 43.3 fl      MPV 11.3 fL      Platelets 195 10*3/mm3      Neutrophil % 70.8 %      Lymphocyte % 16.3 %      Monocyte % 9.6 %      Eosinophil % 2.1 %      Basophil % 1.2 %      Neutrophils, Absolute 8.10 10*3/mm3      Lymphocytes, Absolute 1.90 10*3/mm3      Monocytes, Absolute 1.10 10*3/mm3      Eosinophils, Absolute 0.20 10*3/mm3      Basophils, Absolute 0.10 10*3/mm3      nRBC 0.0 /100 WBC     POC Glucose Once [[id number]]  (Abnormal) Collected: 01/23/24 0956    Specimen: Blood Updated: 01/23/24 0959     Glucose >600 mg/dL      Comment: Serial Number: [id number]:  [id number]                Imaging Results (Last 24 Hours)       ** No results found for the last 24 hours. **              Assessment & Plan        This is a 49 y.o. male with:    Active and Resolved Problems  Active Hospital Problems    Diagnosis  POA    **Hyperglycemia due to diabetes mellitus [E11.65]  Yes      Resolved Hospital Problems   No resolved problems to display.       Elevated glucose  -Glucose upon arrival 731, now 400  -Sodium corrected 140  -Potassium 5.0   -VBG pH 7.370.  pCO2 47.8.  pO2 35.0.  H CO3 27.7.  -Anion gap 10.  -A1c pending   -UA significant for glucose and trace ketones.    -Acetone negative.   -Endocrinology consultation    Mood disorder  Depression  -Restart home meds once verified    Numbness 4th and 5th digit   -CT head     Substance abuse  -Methamphetamine use, last time 1/22/2024  -Monitor for withdrawal  -Encourage cessation    Tobacco abuse  -Encourage cessation  -Nicotine patch    Hx of ETOH abuse   -denies use 2 years     DVT  prophylaxis:  Medical DVT prophylaxis orders are present.    The patient desires to be as follows: Full Code     CODE STATUS:    Level Of Support Discussed With: Patient  Code Status (Patient has no pulse and is not breathing): CPR (Attempt to Resuscitate)  Medical Interventions (Patient has pulse or is breathing): Full Support        I discussed the patient's findings and my recommendations with patient.      Signature:     This document has been electronically signed by Nadine Vasquez PA-C on January 23, 2024 15:03 [facility] Hospitalist Team

## 2024-01-23 NOTE — Clinical Note
Level of Care: Progressive Care [20]   Diagnosis: Hyperglycemia due to diabetes mellitus [1960956]   Certification: I Certify That Inpatient Hospital Services Are Medically Necessary For Greater Than 2 Midnights

## 2024-01-23 NOTE — LETTER
January 25, 2024     Patient: Teofilo Mayers   YOB: 1975   Date of Visit: 1/23/2024       To Whom It May Concern:  Teofilo Diaz was in my care 1/23/24 to 1/25/24  It is my medical opinion that Teofilo Mayers may return to work on 1/26/24 .           Sincerely,        MD Aroldo/Dacia RN    CC:   No Recipients

## 2024-01-23 NOTE — ED PROVIDER NOTES
"Subjective   History of Present Illness  49-year-old male reports that he has a history of borderline type 2 diabetes has been on metformin but her last couple weeks has been increased thirst and had some outpatient lab work performed last week and was called today to notify him that his blood sugars were dangerously elevated.  He denies chest or abdominal pain fevers or chills or shortness of breath or vomiting or diarrhea.  Review of Systems    No past medical history on file.    No Known Allergies    No past surgical history on file.    No family history on file.    Social History     Socioeconomic History    Marital status: Single     Prior to Admission medications    Not on File     /77   Pulse 82   Temp 98 °F (36.7 °C) (Oral)   Resp 17   Ht 182.9 cm (72\")   Wt 59.7 kg (131 lb 9.6 oz)   SpO2 96%   BMI 17.85 kg/m²         Objective   Physical Exam  General: Well-developed well-appearing, no acute distress, alert and appropriate  Eyes:  sclera nonicteric  HEENT: Mucous membranes somewhat dry, no mucosal swelling  Neck: Supple, no nuchal rigidity,   Respirations: Respirations nonlabored, equal breath sounds bilaterally, clear lungs  Heart regular rate and rhythm, no murmurs rubs or gallops,   Abdomen soft nontender nondistended, no hepatosplenomegaly, no hernia, no mass, normal bowel sounds, no CVA tenderness  Extremities no clubbing cyanosis or edema, calves are symmetric and nontender  Neuro cranial nerves grossly intact, no focal limb deficits  Psych oriented, pleasant affect  Skin no rash, brisk cap refill  Procedures           ED Course      Results for orders placed or performed during the hospital encounter of 01/23/24   Urinalysis With Microscopic If Indicated (No Culture) - Urine, Clean Catch    Specimen: Urine, Clean Catch   Result Value Ref Range    Color, UA Yellow Yellow, Straw    Appearance, UA Clear Clear    pH, UA 7.0 5.0 - 8.0    Specific Gravity, UA 1.033 (H) 1.005 - 1.030    " Glucose, UA >=1000 mg/dL (3+) (A) Negative    Ketones, UA Trace (A) Negative    Bilirubin, UA Negative Negative    Blood, UA Negative Negative    Protein, UA Negative Negative    Leuk Esterase, UA Negative Negative    Nitrite, UA Negative Negative    Urobilinogen, UA 1.0 E.U./dL 0.2 - 1.0 E.U./dL   Comprehensive Metabolic Panel    Specimen: Blood   Result Value Ref Range    Glucose 731 (C) 65 - 99 mg/dL    BUN 14 6 - 20 mg/dL    Creatinine 1.06 0.76 - 1.27 mg/dL    Sodium 125 (L) 136 - 145 mmol/L    Potassium 5.0 3.5 - 5.2 mmol/L    Chloride 86 (L) 98 - 107 mmol/L    CO2 29.0 22.0 - 29.0 mmol/L    Calcium 9.8 8.6 - 10.5 mg/dL    Total Protein 6.9 6.0 - 8.5 g/dL    Albumin 4.2 3.5 - 5.2 g/dL    ALT (SGPT) 22 1 - 41 U/L    AST (SGOT) 13 1 - 40 U/L    Alkaline Phosphatase 169 (H) 39 - 117 U/L    Total Bilirubin 0.4 0.0 - 1.2 mg/dL    Globulin 2.7 gm/dL    A/G Ratio 1.6 g/dL    BUN/Creatinine Ratio 13.2 7.0 - 25.0    Anion Gap 10.0 5.0 - 15.0 mmol/L    eGFR 87.1 >60.0 mL/min/1.73   TSH    Specimen: Blood   Result Value Ref Range    TSH 3.760 0.270 - 4.200 uIU/mL   Magnesium    Specimen: Blood   Result Value Ref Range    Magnesium 1.9 1.6 - 2.6 mg/dL   Blood Gas, Venous -    Specimen: Venous Blood   Result Value Ref Range    Site CentralLine     pH, Venous 7.370 7.320 - 7.430 pH Units    pCO2, Venous 47.8 42.0 - 51.0 mm Hg    pO2, Venous 35.0 (C) 40.0 - 42.0 mm Hg    HCO3, Venous 27.7 (H) 22.0 - 26.0 mmol/L    Base Excess, Venous 1.5 -2.0 - 2.0 mmol/L    O2 Saturation, Venous 64.7 45.0 - 75.0 %    CO2 Content 29.2 (H) 22 - 29 mmol/L    Barometric Pressure for Blood Gas      Modality Room Air     FIO2 21 %   CBC Auto Differential    Specimen: Blood   Result Value Ref Range    WBC 11.40 (H) 3.40 - 10.80 10*3/mm3    RBC 5.00 4.14 - 5.80 10*6/mm3    Hemoglobin 14.9 13.0 - 17.7 g/dL    Hematocrit 45.3 37.5 - 51.0 %    MCV 90.7 79.0 - 97.0 fL    MCH 29.8 26.6 - 33.0 pg    MCHC 32.8 31.5 - 35.7 g/dL    RDW 13.0 12.3 - 15.4 %     RDW-SD 43.3 37.0 - 54.0 fl    MPV 11.3 6.0 - 12.0 fL    Platelets 195 140 - 450 10*3/mm3    Neutrophil % 70.8 42.7 - 76.0 %    Lymphocyte % 16.3 (L) 19.6 - 45.3 %    Monocyte % 9.6 5.0 - 12.0 %    Eosinophil % 2.1 0.3 - 6.2 %    Basophil % 1.2 0.0 - 1.5 %    Neutrophils, Absolute 8.10 (H) 1.70 - 7.00 10*3/mm3    Lymphocytes, Absolute 1.90 0.70 - 3.10 10*3/mm3    Monocytes, Absolute 1.10 (H) 0.10 - 0.90 10*3/mm3    Eosinophils, Absolute 0.20 0.00 - 0.40 10*3/mm3    Basophils, Absolute 0.10 0.00 - 0.20 10*3/mm3    nRBC 0.0 0.0 - 0.2 /100 WBC   Acetone    Specimen: Blood   Result Value Ref Range    Acetone Negative Negative   POC Glucose Once    Specimen: Blood   Result Value Ref Range    Glucose >600 (C) 70 - 105 mg/dL   POC Glucose Once    Specimen: Blood   Result Value Ref Range    Glucose 401 (C) 70 - 105 mg/dL   Gold Top - SST   Result Value Ref Range    Extra Tube Hold for add-ons.                                             Medical Decision Making  Patient presents with reports of elevated blood sugar differential diagnose including DKA, hyperosmolar hyper ketotic syndrome, sepsis    Patient is afebrile without signs of infection.  He does have findings of severe hyperglycemia without acidosis.  He has normal mental status.  He was ordered IV fluids as he does appear somewhat dehydrated and was ordered some subcu insulin.  The hospitalist service was paged for admission.  Patient was advised of findings and agreeable to plan of admission.  Case discussed with Pauly with the hospitalist service for admission.    Problems Addressed:  Dehydration: complicated acute illness or injury  Hyperglycemia: complicated acute illness or injury    Amount and/or Complexity of Data Reviewed  Labs: ordered. Decision-making details documented in ED Course.     Details: Severe hyperglycemia without acidosis, CBC shows borderline leukocytosis, TSH normal, magnesium normal, urinalysis trace ketones    Risk  Prescription drug  management.  Decision regarding hospitalization.        Final diagnoses:   Hyperglycemia   Dehydration       ED Disposition  ED Disposition       ED Disposition   Decision to Admit    Condition   --    Comment   Level of Care: Telemetry [5]   Admitting Physician: BRIAN EVANS [[id number]]   Attending Physician: BRIAN EVANS [[id number]]                 No follow-up provider specified.       Medication List      No changes were made to your prescriptions during this visit.            Isaias Fox MD  01/23/24 7838

## 2024-01-24 LAB
ANION GAP SERPL CALCULATED.3IONS-SCNC: 9 MMOL/L (ref 5–15)
BASOPHILS # BLD AUTO: 0.2 10*3/MM3 (ref 0–0.2)
BASOPHILS NFR BLD AUTO: 1.4 % (ref 0–1.5)
BUN SERPL-MCNC: 14 MG/DL (ref 6–20)
BUN/CREAT SERPL: 21.5 (ref 7–25)
CALCIUM SPEC-SCNC: 8.4 MG/DL (ref 8.6–10.5)
CHLORIDE SERPL-SCNC: 104 MMOL/L (ref 98–107)
CO2 SERPL-SCNC: 24 MMOL/L (ref 22–29)
CREAT SERPL-MCNC: 0.65 MG/DL (ref 0.76–1.27)
DEPRECATED RDW RBC AUTO: 41.6 FL (ref 37–54)
EGFRCR SERPLBLD CKD-EPI 2021: 117 ML/MIN/1.73
EOSINOPHIL # BLD AUTO: 0.3 10*3/MM3 (ref 0–0.4)
EOSINOPHIL NFR BLD AUTO: 2.2 % (ref 0.3–6.2)
ERYTHROCYTE [DISTWIDTH] IN BLOOD BY AUTOMATED COUNT: 12.8 % (ref 12.3–15.4)
GLUCOSE BLDC GLUCOMTR-MCNC: 123 MG/DL (ref 70–105)
GLUCOSE BLDC GLUCOMTR-MCNC: 141 MG/DL (ref 70–105)
GLUCOSE BLDC GLUCOMTR-MCNC: 169 MG/DL (ref 70–105)
GLUCOSE BLDC GLUCOMTR-MCNC: 287 MG/DL (ref 70–105)
GLUCOSE BLDC GLUCOMTR-MCNC: 354 MG/DL (ref 70–105)
GLUCOSE SERPL-MCNC: 286 MG/DL (ref 65–99)
HCT VFR BLD AUTO: 40.2 % (ref 37.5–51)
HGB BLD-MCNC: 13 G/DL (ref 13–17.7)
LYMPHOCYTES # BLD AUTO: 2.8 10*3/MM3 (ref 0.7–3.1)
LYMPHOCYTES NFR BLD AUTO: 24.6 % (ref 19.6–45.3)
MCH RBC QN AUTO: 29.2 PG (ref 26.6–33)
MCHC RBC AUTO-ENTMCNC: 32.4 G/DL (ref 31.5–35.7)
MCV RBC AUTO: 90.1 FL (ref 79–97)
MONOCYTES # BLD AUTO: 1.1 10*3/MM3 (ref 0.1–0.9)
MONOCYTES NFR BLD AUTO: 9.2 % (ref 5–12)
NEUTROPHILS NFR BLD AUTO: 62.6 % (ref 42.7–76)
NEUTROPHILS NFR BLD AUTO: 7.2 10*3/MM3 (ref 1.7–7)
NRBC BLD AUTO-RTO: 0 /100 WBC (ref 0–0.2)
PLATELET # BLD AUTO: 171 10*3/MM3 (ref 140–450)
PMV BLD AUTO: 10.6 FL (ref 6–12)
POTASSIUM SERPL-SCNC: 4.2 MMOL/L (ref 3.5–5.2)
RBC # BLD AUTO: 4.46 10*6/MM3 (ref 4.14–5.8)
SODIUM SERPL-SCNC: 137 MMOL/L (ref 136–145)
WBC NRBC COR # BLD AUTO: 11.4 10*3/MM3 (ref 3.4–10.8)

## 2024-01-24 PROCEDURE — 63710000001 INSULIN LISPRO (HUMAN) PER 5 UNITS: Performed by: INTERNAL MEDICINE

## 2024-01-24 PROCEDURE — 82948 REAGENT STRIP/BLOOD GLUCOSE: CPT

## 2024-01-24 PROCEDURE — 63710000001 INSULIN GLARGINE PER 5 UNITS: Performed by: INTERNAL MEDICINE

## 2024-01-24 PROCEDURE — 63710000001 INSULIN LISPRO (HUMAN) PER 5 UNITS: Performed by: PHYSICIAN ASSISTANT

## 2024-01-24 PROCEDURE — G0108 DIAB MANAGE TRN  PER INDIV: HCPCS

## 2024-01-24 PROCEDURE — 82948 REAGENT STRIP/BLOOD GLUCOSE: CPT | Performed by: INTERNAL MEDICINE

## 2024-01-24 PROCEDURE — 82948 REAGENT STRIP/BLOOD GLUCOSE: CPT | Performed by: PHYSICIAN ASSISTANT

## 2024-01-24 PROCEDURE — 80048 BASIC METABOLIC PNL TOTAL CA: CPT | Performed by: PHYSICIAN ASSISTANT

## 2024-01-24 PROCEDURE — 25810000003 SODIUM CHLORIDE 0.9 % SOLUTION: Performed by: PHYSICIAN ASSISTANT

## 2024-01-24 PROCEDURE — 85025 COMPLETE CBC W/AUTO DIFF WBC: CPT | Performed by: PHYSICIAN ASSISTANT

## 2024-01-24 RX ORDER — IBUPROFEN 600 MG/1
1 TABLET ORAL
Status: DISCONTINUED | OUTPATIENT
Start: 2024-01-24 | End: 2024-01-25 | Stop reason: HOSPADM

## 2024-01-24 RX ORDER — DEXTROSE MONOHYDRATE 25 G/50ML
25 INJECTION, SOLUTION INTRAVENOUS
Status: DISCONTINUED | OUTPATIENT
Start: 2024-01-24 | End: 2024-01-25 | Stop reason: HOSPADM

## 2024-01-24 RX ORDER — INSULIN LISPRO 100 [IU]/ML
2-9 INJECTION, SOLUTION INTRAVENOUS; SUBCUTANEOUS
Status: DISCONTINUED | OUTPATIENT
Start: 2024-01-24 | End: 2024-01-25 | Stop reason: HOSPADM

## 2024-01-24 RX ORDER — NICOTINE POLACRILEX 4 MG
15 LOZENGE BUCCAL
Status: DISCONTINUED | OUTPATIENT
Start: 2024-01-24 | End: 2024-01-25 | Stop reason: HOSPADM

## 2024-01-24 RX ORDER — INSULIN LISPRO 100 [IU]/ML
1-200 INJECTION, SOLUTION INTRAVENOUS; SUBCUTANEOUS
Status: DISCONTINUED | OUTPATIENT
Start: 2024-01-24 | End: 2024-01-24

## 2024-01-24 RX ORDER — QUETIAPINE FUMARATE 50 MG/1
50 TABLET, EXTENDED RELEASE ORAL
Status: DISCONTINUED | OUTPATIENT
Start: 2024-01-24 | End: 2024-01-25 | Stop reason: HOSPADM

## 2024-01-24 RX ORDER — INSULIN LISPRO 100 [IU]/ML
5 INJECTION, SOLUTION INTRAVENOUS; SUBCUTANEOUS
Status: DISCONTINUED | OUTPATIENT
Start: 2024-01-24 | End: 2024-01-25 | Stop reason: HOSPADM

## 2024-01-24 RX ADMIN — INSULIN LISPRO 5 UNITS: 100 INJECTION, SOLUTION INTRAVENOUS; SUBCUTANEOUS at 09:41

## 2024-01-24 RX ADMIN — INSULIN GLARGINE 15 UNITS: 100 INJECTION, SOLUTION SUBCUTANEOUS at 09:41

## 2024-01-24 RX ADMIN — SODIUM CHLORIDE 150 ML/HR: 9 INJECTION, SOLUTION INTRAVENOUS at 09:43

## 2024-01-24 RX ADMIN — Medication 1 PATCH: at 15:06

## 2024-01-24 RX ADMIN — ATORVASTATIN CALCIUM 20 MG: 20 TABLET, FILM COATED ORAL at 09:41

## 2024-01-24 RX ADMIN — QUETIAPINE FUMARATE 50 MG: 50 TABLET, EXTENDED RELEASE ORAL at 07:44

## 2024-01-24 RX ADMIN — Medication 10 ML: at 09:43

## 2024-01-24 RX ADMIN — GABAPENTIN 800 MG: 400 CAPSULE ORAL at 21:16

## 2024-01-24 RX ADMIN — ARIPIPRAZOLE 20 MG: 10 TABLET ORAL at 09:41

## 2024-01-24 RX ADMIN — Medication 100 MG: at 09:41

## 2024-01-24 RX ADMIN — INSULIN LISPRO 5 UNITS: 100 INJECTION, SOLUTION INTRAVENOUS; SUBCUTANEOUS at 14:01

## 2024-01-24 RX ADMIN — INSULIN LISPRO 5 UNITS: 100 INJECTION, SOLUTION INTRAVENOUS; SUBCUTANEOUS at 18:15

## 2024-01-24 RX ADMIN — QUETIAPINE FUMARATE 600 MG: 100 TABLET ORAL at 21:15

## 2024-01-24 RX ADMIN — PANTOPRAZOLE SODIUM 40 MG: 40 TABLET, DELAYED RELEASE ORAL at 05:14

## 2024-01-24 RX ADMIN — Medication 10 ML: at 21:15

## 2024-01-24 RX ADMIN — INSULIN LISPRO 2 UNITS: 100 INJECTION, SOLUTION INTRAVENOUS; SUBCUTANEOUS at 18:15

## 2024-01-24 RX ADMIN — INSULIN LISPRO 2 UNITS: 100 INJECTION, SOLUTION INTRAVENOUS; SUBCUTANEOUS at 07:43

## 2024-01-24 RX ADMIN — INSULIN LISPRO 4 UNITS: 100 INJECTION, SOLUTION INTRAVENOUS; SUBCUTANEOUS at 00:27

## 2024-01-24 RX ADMIN — GABAPENTIN 800 MG: 400 CAPSULE ORAL at 09:40

## 2024-01-24 RX ADMIN — INSULIN GLARGINE 15 UNITS: 100 INJECTION, SOLUTION SUBCUTANEOUS at 21:15

## 2024-01-24 RX ADMIN — FERROUS SULFATE TAB EC 324 MG (65 MG FE EQUIVALENT) 324 MG: 324 (65 FE) TABLET DELAYED RESPONSE at 07:44

## 2024-01-24 RX ADMIN — ESCITALOPRAM OXALATE 5 MG: 10 TABLET ORAL at 09:40

## 2024-01-24 NOTE — PLAN OF CARE
Goal Outcome Evaluation:                                            Patient doing okay this shift. Continuing to monitor blood sugars and treating.

## 2024-01-24 NOTE — PROGRESS NOTES
[facility] MEDICINE SERVICE  DAILY PROGRESS NOTE    NAME: Teofilo Mayers  : 1975  MRN: [id number]      LOS: 1 day     PROVIDER OF SERVICE: DIPIKA Lopez    Chief Complaint: Hyperglycemia due to diabetes mellitus    Subjective:     Interval History:  History taken from: patient chart  Patient Complaints: none  Patient Denies: Abdominal pain, vomiting, diarrhea    Review of Systems:   Review of Systems   Constitutional: Negative.    HENT: Negative.     Eyes: Negative.    Respiratory: Negative.     Cardiovascular: Negative.    Gastrointestinal: Negative.    Endocrine: Negative.    Genitourinary: Negative.    Musculoskeletal: Negative.    Skin: Negative.    Neurological:  Positive for numbness. Negative for dizziness, tremors, seizures, syncope, facial asymmetry, speech difficulty, weakness, light-headedness and headaches.   Hematological: Negative.    Psychiatric/Behavioral: Negative.         Objective:     Vital Signs  Temp:  [97.7 °F (36.5 °C)-97.9 °F (36.6 °C)] 97.8 °F (36.6 °C)  Heart Rate:  [73-89] 79  Resp:  [12-18] 13  BP: ()/(53-77) 98/61   Body mass index is 17.85 kg/m².    Physical Exam  Physical Exam  Vitals and nursing note reviewed.   Constitutional:       Appearance: Normal appearance. He is normal weight.   HENT:      Head: Normocephalic.      Nose: Nose normal.   Eyes:      Extraocular Movements: Extraocular movements intact.      Conjunctiva/sclera: Conjunctivae normal.   Cardiovascular:      Rate and Rhythm: Normal rate and regular rhythm.   Pulmonary:      Effort: Pulmonary effort is normal.      Breath sounds: Rhonchi present.   Abdominal:      General: Abdomen is flat. Bowel sounds are normal.      Palpations: Abdomen is soft.   Musculoskeletal:         General: No swelling, tenderness, deformity or signs of injury. Normal range of motion.      Cervical back: Normal range of motion.      Right lower leg: Edema present.      Left lower leg: No edema.   Skin:     General:  Skin is warm and dry.   Neurological:      General: No focal deficit present.      Mental Status: He is alert. Mental status is at baseline.   Psychiatric:         Mood and Affect: Mood normal.         Behavior: Behavior normal.         Thought Content: Thought content normal.         Judgment: Judgment normal.         Scheduled Meds   ARIPiprazole, 20 mg, Oral, Daily  atorvastatin, 20 mg, Oral, Daily  enoxaparin, 40 mg, Subcutaneous, Daily  escitalopram, 5 mg, Oral, Daily  ferrous sulfate, 324 mg, Oral, Daily With Breakfast  gabapentin, 800 mg, Oral, BID  insulin glargine, 15 Units, Subcutaneous, Q12H  insulin lispro, 2-9 Units, Subcutaneous, 4x Daily AC & at Bedtime  insulin lispro, 5 Units, Subcutaneous, TID With Meals  nicotine, 1 patch, Transdermal, Q24H  pantoprazole, 40 mg, Oral, Q AM  QUEtiapine, 600 mg, Oral, Nightly  QUEtiapine fumarate ER, 50 mg, Oral, Daily With Breakfast  sodium chloride, 10 mL, Intravenous, Q12H  thiamine, 100 mg, Oral, Daily       PRN Meds     acetaminophen **OR** acetaminophen **OR** acetaminophen    aluminum-magnesium hydroxide-simethicone    polyethylene glycol **AND** bisacodyl **AND** bisacodyl    Calcium Replacement - Follow Nurse / BPA Driven Protocol    dextrose    dextrose    dextrose    dextrose    glucagon (human recombinant)    glucagon (human recombinant)    Magnesium Standard Dose Replacement - Follow Nurse / BPA Driven Protocol    melatonin    ondansetron ODT **OR** ondansetron    Phosphorus Replacement - Follow Nurse / BPA Driven Protocol    Potassium Replacement - Follow Nurse / BPA Driven Protocol    [COMPLETED] Insert Peripheral IV **AND** sodium chloride    sodium chloride    sodium chloride   Infusions         Diagnostic Data    Results from last 7 days   Lab Units 01/24/24  0622 01/24/24  0514 01/23/24  1008   WBC 10*3/mm3  --  11.40* 11.40*   HEMOGLOBIN g/dL  --  13.0 14.9   HEMATOCRIT %  --  40.2 45.3   PLATELETS 10*3/mm3  --  171 195   GLUCOSE mg/dL 286*  --   731*   CREATININE mg/dL 0.65*  --  1.06   BUN mg/dL 14  --  14   SODIUM mmol/L 137  --  125*   POTASSIUM mmol/L 4.2  --  5.0   AST (SGOT) U/L  --   --  13   ALT (SGPT) U/L  --   --  22   ALK PHOS U/L  --   --  169*   BILIRUBIN mg/dL  --   --  0.4   ANION GAP mmol/L 9.0  --  10.0       CT Head Without Contrast    Result Date: 1/23/2024  Impression: No acute intracranial abnormality. Electronically Signed: Kodak Coats MD  1/23/2024 4:28 PM EST  Workstation ID: [id number]    XR Chest PA & Lateral    Result Date: 1/23/2024  Impression: No active pulmonary process. Electronically Signed: Brian Woods MD  1/23/2024 3:18 PM EST  Workstation ID: [id number]       I reviewed the patient's new clinical results.  Discussed with patient    Assessment/Plan:     Active and Resolved Problems  Active Hospital Problems    Diagnosis  POA    **Hyperglycemia due to diabetes mellitus [E11.65]  Yes      Resolved Hospital Problems   No resolved problems to display.       Hyperglycemia  -Blood sugars have ranged from 287-354 today.  Although this is a much better improvement from the initial 700 on admission, they are still consistently elevated.  - Endocrine consulted for specific comorbidities recallable as well as education.  Patient also needs to have follow-up appointment to establish care prior to discharge.  -Nutritional consult ordered as well as this will work in conjunction and attempt to obtain better management of patient's hyperglycemia    Substance abuse  -Monitor for withdrawal  - Encourage cessation  - Social work consult ordered to provide additional outside resources    Tobacco abuse  - Encourage cessation  - Nicotine patch applied    DVT prophylaxis:  Medical DVT prophylaxis orders are present.         Code status is   Code Status and Medical Interventions:   Ordered at: 01/23/24 1502     Level Of Support Discussed With:    Patient     Code Status (Patient has no pulse and is not breathing):    CPR (Attempt to  Resuscitate)     Medical Interventions (Patient has pulse or is breathing):    Full Support       Plan for disposition: Tomorrow    Time: 30 minutes    Signature: Electronically signed by DIPIKA Lopez, 01/24/24, 11:57 EST.  [facility] Hospitalist Team      Addendum:    I saw and examined the patient in addition to the KAROLYN.  I agree with assessment and plan with the following addendum:    General Appearance:  Alert, cooperative, no distress, appears stated age  Head:  Normocephalic, without obvious abnormality, atraumatic  Eyes:  PERRL, conjunctiva/corneas clear, EOM's intact, fundi benign, both eyes  Ears:  Normal TM's and external ear canals, both ears  Nose: Nares normal, septum midline, mucosa normal, no drainage or sinus tenderness  Throat: Lips, mucosa, and tongue normal; teeth and gums normal  Neck: Supple, symmetrical, trachea midline, no adenopathy, thyroid: not enlarged, symmetric, no tenderness/mass/nodules, no carotid bruit or JVD  Lungs:   Clear to auscultation bilaterally, respirations unlabored  Heart: Regular rate and rhythm, S1, S2 normal, no murmur, rub or gallop  Abdomen:  Soft, non-tender, bowel sounds active all four quadrants,  no masses, no organomegaly  Extremities: Extremities normal, atraumatic, no cyanosis or edema  Pulses: 2+ and symmetric  Skin: Skin color, texture, turgor normal, no rashes or lesions  Neurologic: Normal      The patient feels much better today.  His blood sugars have improved although they are still elevated over 200.  I will initiate him on scheduled Lantus twice daily, scheduled prandial insulin and correction insulin with sliding scale.  A1c of 16.5 indicates extremely poor glycemic control prior to admission, with average blood sugars likely over 400.  Diabetic educator consult is pending.  Likely DC home tomorrow.    MD Jacqu[facility] Hospitalist Team  01/24/24  13:15 EST

## 2024-01-24 NOTE — CASE MANAGEMENT/SOCIAL WORK
Discharge Planning Assessment   [facility]     Patient Name: Teofilo Mayers  MRN: [id number]  Today's Date: 1/23/2024    Admit Date: 1/23/2024    Plan: Home with parents   Discharge Needs Assessment       Row Name 01/23/24 2020       Living Environment    People in Home parent(s)    Current Living Arrangements home    Potentially Unsafe Housing Conditions none    In the past 12 months has the electric, gas, oil, or water company threatened to shut off services in your home? No    Primary Care Provided by self    Provides Primary Care For no one    Able to Return to Prior Arrangements yes       Resource/Environmental Concerns    Resource/Environmental Concerns none    Transportation Concerns none       Transportation Needs    In the past 12 months, has lack of transportation kept you from medical appointments or from getting medications? no    In the past 12 months, has lack of transportation kept you from meetings, work, or from getting things needed for daily living? No       Food Insecurity    Within the past 12 months, you worried that your food would run out before you got the money to buy more. Never true    Within the past 12 months, the food you bought just didn't last and you didn't have money to get more. Never true       Transition Planning    Patient/Family Anticipates Transition to home with family    Patient/Family Anticipated Services at Transition none    Transportation Anticipated family or friend will provide       Discharge Needs Assessment    Readmission Within the Last 30 Days no previous admission in last 30 days    Equipment Currently Used at Home none    Concerns to be Addressed substance/tobacco abuse/use  Need Sw eval + Meth use    Anticipated Changes Related to Illness none    Equipment Needed After Discharge none                   Discharge Plan       Row Name 01/23/24 2021       Plan    Plan Home with parents    Plan Comments Met with Patient at bedside Lives at home with parents. IADL's PCP and  Pharamcy verified, able to afford medications. No transportation or finanical Conserns will need SW eval. DC barriers: Diabetic educator, Endocrine consult                  Continued Care and Services - Admitted Since 1/23/2024    Coordination has not been started for this encounter.       Expected Discharge Date and Time       Expected Discharge Date Expected Discharge Time    Jan 25, 2024            Demographic Summary       Row Name 01/23/24 2019       General Information    Admission Type inpatient    Arrived From emergency department    Required Notices Provided Important Message from Medicare    Referral Source admission list    Reason for Consult discharge planning    Preferred Language English                   Functional Status       Row Name 01/23/24 2020       Functional Status    Usual Activity Tolerance good    Current Activity Tolerance good       Physical Activity    On average, how many days per week do you engage in moderate to strenuous exercise (like a brisk walk)? 7 days    On average, how many minutes do you engage in exercise at this level? 30 min    Number of minutes of exercise per week 210       Functional Status, IADL    Medications independent    Meal Preparation independent    Housekeeping independent    Laundry independent    Shopping independent       Mental Status    General Appearance WDL WDL       Mental Status Summary    Recent Changes in Mental Status/Cognitive Functioning no changes                      Yaritza Shi, RN

## 2024-01-24 NOTE — CONSULTS
"Diabetes Education  Assessment/Teaching    Patient Name:  Teofilo Mayers  YOB: 1975  MRN: [id number]  Admit Date:  1/23/2024      Assessment Date:  1/24/2024  Flowsheet Row Most Recent Value   General Information     Referral From: A1c, Blood glucose, MD order  [MD consult for blood glucose >200, admission blood sugar 731, and on 1/23/2024 A1c was 16.5%.]   Height 182.9 cm (72\")   Height Method Stated   Weight 59.7 kg (131 lb 9.6 oz)   Weight Method Standing scale   Pregnancy Assessment    Diabetes History    What type of diabetes do you have? Type 2   Length of Diabetes Diagnosis Newly diagnosed <6 months  [Newly diagnosed this admission.]   Current DM knowledge poor   Have you had diabetes education/teaching in the past? no   Do you test your blood sugar at home? no   Have you had high blood sugar? (>140mg/dl) yes   How often do you have high blood sugar? unknown   When was your last high blood sugar? Admission blood sugar 731   Education Preferences    What areas of diabetes would you like to learn about? avoiding high blood sugar, avoiding low blood sugar, diabetes complications, diet information, medications for diabetes, understanding diabetes, testing my blood sugar at home, resources on diabetes   Nutrition Information    Assessment Topics    Healthy Eating - Assessment Needs education   Being Active - Assessment Needs education   Taking Medication - Assessment Needs education   Problem Solving - Assessment Needs education   Reducing Risk - Assessment Needs education   Monitoring - Assessment Needs education   DM Goals    Healthy Eating - Goal Today   Being Active - Goal Today   Taking Medication - Goal Today   Problem Solving - Goal Today   Reducing Risk - Goal Today   Monitoring - Goal Today            Flowsheet Row Most Recent Value   DM Education Needs    Meter Needs meter   Meter Type Accuchek   Frequency of Testing AC meals  [Discussed with patient that it is recommended to check blood sugar " 3X a day before meals.]   Medication Insulin, Actions, Side effects, Administration, Pen   Problem Solving Hypoglycemia, Signs, Symptoms, Treatment, Hyperglycemia   Reducing Risks A1C testing, Blood pressure, Lipids, Eye exam, Dental exam, Foot care, Immunizations, Cardiovascular, Neuropathy, Retinopathy  [On 1/23/2024 A1c was 16.5%.]   Healthy Eating Basic meal plan provided   Discharge Plan Home   Motivation Moderate   Teaching Method Explanation, Demonstration, Handouts, Teach back, Discussion   Patient Response Demonstrates adequately, Verbalized understanding  [Father at bedside and involved with discussion.]              Other Comments:  A1c info sheet given with discussion on being newly diagnosed with diabetes,  A1c target, and healthy blood sugar range. Patient stated he was told about a year ago that he had pre-diabetes and hasn't received any education on diabetes. First Steps book to managing diabetes given to patient with discussion on diabetes and diagnosis. Discussed how diabetes puts you at higher risk for heart attack, stroke, kidney failure, blindness, and neuropathy.  Also discussed the importance of yearly eye exams, dental exams, regular follow-up with PCP to check blood pressure, lipids, kidney function, liver function, and to keep up-to-date on vaccinations. Foot care discussed with checking feet daily and wearing shoes when out of bed. Jennifer pamphlet given on offering of classes. Handouts given with discussion on 1 serving of carbs being = 15 grams, being allowed 4 servings  of carbs per meal, counting carbs, reading food labels, and meal planning. Patient stated he has been drinking protein drinks to try to help him gain weight. Instructed patient to choice the low sugar/low carbohydrate protein drinks. Had patient watch video on how to use the Accu-chek Guide Me glucometer and discussed pricking fingers on the side where less sensitive. Handouts given with discussion on types of insulin,  storage of insulin, injection sites, disposal of needles, rotation of sites, and how to use an insulin pen. Patient did return demonstration of applying the pen needle to the insulin pen, priming the pen, administering the shot into the foam pad,and holding the pen for 10 seconds after administration. Handouts given with discussion on hypoglycemia and hyperglycemia signs, symptoms, and treatment. Patient stated he has never given himself a shot. Discussed with bedside nurse to have patient give self insulin shots. Patient stated that he wants to be on a continuous glucose monitor like his Dad.  Prescriptions started in discharge orders for Lantus, Lispro, pen needles, Freestyle Raymond 3 sensors and reader,  lancets, test strips, and Accu-chek Guide Me glucometer. Discussed with patient the importance of exercise in blood sugar control. Patient stated maybe he will do the silver sneakers and go to the [facility]. Patient has no further questions or concerns related to diabetes at this time.        Electronically signed by:  Eleanor Man RN  01/24/24 17:04 EST

## 2024-01-25 VITALS
DIASTOLIC BLOOD PRESSURE: 65 MMHG | TEMPERATURE: 97.9 F | HEIGHT: 72 IN | BODY MASS INDEX: 17.82 KG/M2 | HEART RATE: 74 BPM | WEIGHT: 131.6 LBS | RESPIRATION RATE: 13 BRPM | SYSTOLIC BLOOD PRESSURE: 92 MMHG | OXYGEN SATURATION: 97 %

## 2024-01-25 LAB
GLUCOSE BLDC GLUCOMTR-MCNC: 124 MG/DL (ref 70–105)
GLUCOSE BLDC GLUCOMTR-MCNC: 83 MG/DL (ref 70–105)

## 2024-01-25 PROCEDURE — 82948 REAGENT STRIP/BLOOD GLUCOSE: CPT | Performed by: INTERNAL MEDICINE

## 2024-01-25 PROCEDURE — 63710000001 INSULIN LISPRO (HUMAN) PER 5 UNITS: Performed by: INTERNAL MEDICINE

## 2024-01-25 PROCEDURE — 82948 REAGENT STRIP/BLOOD GLUCOSE: CPT

## 2024-01-25 PROCEDURE — 63710000001 INSULIN GLARGINE PER 5 UNITS: Performed by: INTERNAL MEDICINE

## 2024-01-25 RX ORDER — BLOOD-GLUCOSE METER
1 EACH MISCELLANEOUS
Qty: 1 KIT | Refills: 0 | Status: SHIPPED | OUTPATIENT
Start: 2024-01-25

## 2024-01-25 RX ORDER — LANCETS
1 EACH MISCELLANEOUS
Qty: 100 EACH | Refills: 2 | Status: SHIPPED | OUTPATIENT
Start: 2024-01-25

## 2024-01-25 RX ORDER — INSULIN LISPRO 100 [IU]/ML
5 INJECTION, SOLUTION INTRAVENOUS; SUBCUTANEOUS
Qty: 15 ML | Refills: 2 | Status: SHIPPED | OUTPATIENT
Start: 2024-01-25

## 2024-01-25 RX ORDER — BLOOD SUGAR DIAGNOSTIC
1 STRIP MISCELLANEOUS
Qty: 100 EACH | Refills: 2 | Status: SHIPPED | OUTPATIENT
Start: 2024-01-25

## 2024-01-25 RX ORDER — PEN NEEDLE, DIABETIC 30 GX3/16"
1 NEEDLE, DISPOSABLE MISCELLANEOUS
Qty: 200 EACH | Refills: 2 | Status: SHIPPED | OUTPATIENT
Start: 2024-01-25

## 2024-01-25 RX ORDER — BLOOD-GLUCOSE,RECEIVER,CONT
1 EACH MISCELLANEOUS
Qty: 1 EACH | Refills: 0 | Status: SHIPPED | OUTPATIENT
Start: 2024-01-25

## 2024-01-25 RX ORDER — BLOOD-GLUCOSE SENSOR
1 EACH MISCELLANEOUS
Qty: 2 EACH | Refills: 2 | Status: SHIPPED | OUTPATIENT
Start: 2024-01-25

## 2024-01-25 RX ADMIN — PANTOPRAZOLE SODIUM 40 MG: 40 TABLET, DELAYED RELEASE ORAL at 05:42

## 2024-01-25 RX ADMIN — ARIPIPRAZOLE 20 MG: 10 TABLET ORAL at 08:09

## 2024-01-25 RX ADMIN — QUETIAPINE FUMARATE 50 MG: 50 TABLET, EXTENDED RELEASE ORAL at 08:09

## 2024-01-25 RX ADMIN — INSULIN LISPRO 5 UNITS: 100 INJECTION, SOLUTION INTRAVENOUS; SUBCUTANEOUS at 08:08

## 2024-01-25 RX ADMIN — FERROUS SULFATE TAB EC 324 MG (65 MG FE EQUIVALENT) 324 MG: 324 (65 FE) TABLET DELAYED RESPONSE at 08:09

## 2024-01-25 RX ADMIN — ATORVASTATIN CALCIUM 20 MG: 20 TABLET, FILM COATED ORAL at 08:09

## 2024-01-25 RX ADMIN — ESCITALOPRAM OXALATE 5 MG: 10 TABLET ORAL at 08:09

## 2024-01-25 RX ADMIN — Medication 10 ML: at 08:09

## 2024-01-25 RX ADMIN — Medication 100 MG: at 08:09

## 2024-01-25 RX ADMIN — INSULIN GLARGINE 15 UNITS: 100 INJECTION, SOLUTION SUBCUTANEOUS at 08:09

## 2024-01-25 RX ADMIN — GABAPENTIN 800 MG: 400 CAPSULE ORAL at 08:09

## 2024-01-25 NOTE — PLAN OF CARE
Goal Outcome Evaluation:     Pt rested well overnight with no new complaints.

## 2024-01-25 NOTE — DISCHARGE SUMMARY
[facility] Medicine Services  Discharge Summary      Date of Admission: 1/23/2024  Date of Discharge:  1/25/2024  Primary Care Physician: Marla Mcgraw APRN    Presenting Problem/History of Present Illness:  Dehydration [E86.0]  Hyperglycemia [R73.9]  Hyperglycemia due to diabetes mellitus [E11.65]       Final Discharge Diagnoses:  Active Hospital Problems    Diagnosis     **Hyperglycemia due to diabetes mellitus        Consults:   Consults       No orders found from 12/25/2023 to 1/24/2024.            Procedures Performed:                 Pertinent Test Results:   Lab Results (most recent)       Procedure Component Value Units Date/Time    POC Glucose 4x Daily Before Meals & at Bedtime [[id number]]  (Normal) Collected: 01/25/24 1117    Specimen: Blood Updated: 01/25/24 1119     Glucose 83 mg/dL      Comment: Serial Number: [id number]:  [id number]       POC Glucose Once [[id number]]  (Abnormal) Collected: 01/25/24 0706    Specimen: Blood Updated: 01/25/24 0708     Glucose 124 mg/dL      Comment: Serial Number: [id number]:  [id number]       Basic Metabolic Panel [[id number]]  (Abnormal) Collected: 01/24/24 0622    Specimen: Blood from Arm, Left Updated: 01/24/24 0720     Glucose 286 mg/dL      BUN 14 mg/dL      Creatinine 0.65 mg/dL      Sodium 137 mmol/L      Potassium 4.2 mmol/L      Chloride 104 mmol/L      CO2 24.0 mmol/L      Calcium 8.4 mg/dL      BUN/Creatinine Ratio 21.5     Anion Gap 9.0 mmol/L      eGFR 117.0 mL/min/1.73     Narrative:      GFR Normal >60  Chronic Kidney Disease <60  Kidney Failure <15      CBC Auto Differential [[id number]]  (Abnormal) Collected: 01/24/24 0514    Specimen: Blood from Arm, Right Updated: 01/24/24 0525     WBC 11.40 10*3/mm3      RBC 4.46 10*6/mm3      Hemoglobin 13.0 g/dL      Hematocrit 40.2 %      MCV 90.1 fL      MCH 29.2 pg      MCHC 32.4 g/dL      RDW 12.8 %      RDW-SD 41.6 fl      MPV 10.6 fL      Platelets 171 10*3/mm3      Neutrophil  % 62.6 %      Lymphocyte % 24.6 %      Monocyte % 9.2 %      Eosinophil % 2.2 %      Basophil % 1.4 %      Neutrophils, Absolute 7.20 10*3/mm3      Lymphocytes, Absolute 2.80 10*3/mm3      Monocytes, Absolute 1.10 10*3/mm3      Eosinophils, Absolute 0.30 10*3/mm3      Basophils, Absolute 0.20 10*3/mm3      nRBC 0.0 /100 WBC     Hemoglobin A1c [[id number]]  (Abnormal) Collected: 01/23/24 1644    Specimen: Blood from Hand, Right Updated: 01/23/24 1955     Hemoglobin A1C 16.50 %     Blood Gas, Venous - [[id number]]  (Abnormal) Collected: 01/23/24 1056    Specimen: Venous Blood Updated: 01/23/24 1416     Site [facility]     pH, Venous 7.370 pH Units      pCO2, Venous 47.8 mm Hg      pO2, Venous 35.0 mm Hg      HCO3, Venous 27.7 mmol/L      Base Excess, Venous 1.5 mmol/L      Comment: Serial Number: [id number]:  [id number]        O2 Saturation, Venous 64.7 %      CO2 Content 29.2 mmol/L      Barometric Pressure for Blood Gas --     Comment: N/A        Modality Room Air     FIO2 21 %     [place] Draw [[id number]] Collected: 01/23/24 1008    Specimen: Blood Updated: 01/23/24 1300    Narrative:      The following orders were created for panel order [place] Draw.  Procedure                               Abnormality         Status                     ---------                               -----------         ------                     Gold [facility] - [facility][[id number]]                                   Final result                 Please view results for these tests on the individual orders.    Gold Top - SST [[id number]] Collected: 01/23/24 1008    Specimen: Blood Updated: 01/23/24 1300     Extra Tube Hold for add-ons.     Comment: Auto resulted.       Ketone Bodies, Serum (Not performed at [place]) [[id number]]  (Normal) Collected: 01/23/24 1008    Specimen: Blood Updated: 01/23/24 1115    Narrative:      The following orders were created for panel order Ketone Bodies, Serum (Not performed at [place]).  Procedure                                Abnormality         Status                     ---------                               -----------         ------                     Acetone[[id number]]                      Normal              Final result                 Please view results for these tests on the individual orders.    Acetone [[id number]]  (Normal) Collected: 01/23/24 1008    Specimen: Blood Updated: 01/23/24 1115     Acetone Negative    TSH [[id number]]  (Normal) Collected: 01/23/24 1008    Specimen: Blood Updated: 01/23/24 1057     TSH 3.760 uIU/mL     Comprehensive Metabolic Panel [[id number]]  (Abnormal) Collected: 01/23/24 1008    Specimen: Blood Updated: 01/23/24 1053     Glucose 731 mg/dL      BUN 14 mg/dL      Creatinine 1.06 mg/dL      Sodium 125 mmol/L      Potassium 5.0 mmol/L      Chloride 86 mmol/L      CO2 29.0 mmol/L      Calcium 9.8 mg/dL      Total Protein 6.9 g/dL      Albumin 4.2 g/dL      ALT (SGPT) 22 U/L      AST (SGOT) 13 U/L      Alkaline Phosphatase 169 U/L      Total Bilirubin 0.4 mg/dL      Globulin 2.7 gm/dL      A/G Ratio 1.6 g/dL      BUN/Creatinine Ratio 13.2     Anion Gap 10.0 mmol/L      eGFR 87.1 mL/min/1.73     Narrative:      GFR Normal >60  Chronic Kidney Disease <60  Kidney Failure <15      Magnesium [[id number]]  (Normal) Collected: 01/23/24 1008    Specimen: Blood Updated: 01/23/24 1053     Magnesium 1.9 mg/dL     Urinalysis With Microscopic If Indicated (No Culture) - Urine, Clean Catch [[id number]]  (Abnormal) Collected: 01/23/24 1030    Specimen: Urine, Clean Catch Updated: 01/23/24 1041     Color, UA Yellow     Appearance, UA Clear     pH, UA 7.0     Specific Gravity, UA 1.033     Glucose, UA >=1000 mg/dL (3+)     Ketones, UA Trace     Bilirubin, UA Negative     Blood, UA Negative     Protein, UA Negative     Leuk Esterase, UA Negative     Nitrite, UA Negative     Urobilinogen, UA 1.0 E.U./dL    Narrative:      Urine microscopic not indicated.    CBC & Differential [[id number]]  (Abnormal) Collected:  01/23/24 1008    Specimen: Blood Updated: 01/23/24 1023    Narrative:      The following orders were created for panel order CBC & Differential.  Procedure                               Abnormality         Status                     ---------                               -----------         ------                     CBC Auto Differential[[id number]]        Abnormal            Final result                 Please view results for these tests on the individual orders.    CBC Auto Differential [[id number]]  (Abnormal) Collected: 01/23/24 1008    Specimen: Blood Updated: 01/23/24 1023     WBC 11.40 10*3/mm3      RBC 5.00 10*6/mm3      Hemoglobin 14.9 g/dL      Hematocrit 45.3 %      MCV 90.7 fL      MCH 29.8 pg      MCHC 32.8 g/dL      RDW 13.0 %      RDW-SD 43.3 fl      MPV 11.3 fL      Platelets 195 10*3/mm3      Neutrophil % 70.8 %      Lymphocyte % 16.3 %      Monocyte % 9.6 %      Eosinophil % 2.1 %      Basophil % 1.2 %      Neutrophils, Absolute 8.10 10*3/mm3      Lymphocytes, Absolute 1.90 10*3/mm3      Monocytes, Absolute 1.10 10*3/mm3      Eosinophils, Absolute 0.20 10*3/mm3      Basophils, Absolute 0.10 10*3/mm3      nRBC 0.0 /100 WBC           Imaging Results (Most Recent)       Procedure Component Value Units Date/Time    CT Head Without Contrast [[id number]] Collected: 01/23/24 1624     Updated: 01/23/24 1631    Narrative:      CT HEAD WO CONTRAST    Date of Exam: 1/23/2024 4:14 PM EST    Indication: right hand numbness 4th/5th digits.    Comparison: None available.    Technique: Axial CT images were obtained of the head without contrast administration.  Coronal reconstructions were performed.  Automated exposure control and iterative reconstruction methods were used.    Findings:  No intracranial hemorrhage. No mass effect or midline shift. Ventricles and cortical sulci are normally configured. Gray-white matter differentiation maintained. Nonspecific basal ganglia calcification on the left. Globe  "symmetric. No retro-orbital   abnormality. No abnormal extra-axial fluid collection. Visualized paranasal sinuses are well-aerated. Mastoid air cells are well-aerated. No calvarial fracture.      Impression:      Impression:  No acute intracranial abnormality.        Electronically Signed: Kodak Coats MD    1/23/2024 4:28 PM EST    Workstation ID: [id number]    XR Chest PA & Lateral [[id number]] Collected: 01/23/24 1516     Updated: 01/23/24 1520    Narrative:      XR CHEST PA AND LATERAL    Date of Exam: 1/23/2024 3:06 PM EST    Indication: rhonchi    Comparison: Chest radiograph 4/8/2021.    Findings:  Heart size normal. Negative for lobar infiltrate, edema, large effusion or pneumothorax. 8 mm nodular density on the left similar in position to 2021 imaging likely benign granuloma. Osseous structures grossly unremarkable.      Impression:      Impression:  No active pulmonary process.      Electronically Signed: Brian Woods MD    1/23/2024 3:18 PM EST    Workstation ID: [id number]            Chief Complaint on Day of Discharge: hyperglycemia    Hospital Course:  Teofilo Mayers is a 49 y.o. male with a PMH of \" prediabetes\", mood disorder, substance abuse, tobacco abuse who presented to [facility] on 1/23/2024 after receiving a call from his primary care provider to come to the ED for glucose levels greater than 900.  Patient states that he went to his primary care provider yesterday and had some blood work done.  He states over the last year he has been being treated for prediabetes with metformin.  He states he is compliant with medications.  Patient states he is not having any symptoms.  He denies any nausea or vomiting or abdomen pain.  He denies any fever, congestion or cough.  He denies having any chest pains or shortness of air.  He denies having any abdominal pain, constipation or diarrhea.  Denies any melena, hematochezia and hematuria.  He denies any lower extremity wounds.  He does report that he " "has intermittent numbness of his fourth and fifth digit that has been going on for the last couple of days.  Patient reports increasing thirst and urination.  He denies having any unilateral weakness, facial drooping, visual disturbance or slurred speech.  We have been asked to admit this patient for further evaluation and treatment.        In the emergency department, sodium 125 corrected 140, potassium 5.0.  Anion gap 10.0.  Creatinine 1.6.  Glucose upon arrival 731.  Magnesium 1.9.  Alk phos 169.  TSH 3.760.  White blood count 11.40.  Hemoglobin 14.9.  Platelets 195.  UA significant for glucose and trace ketones.  Acetone negative.    The patient was started on insulin, which was adjusted through his stay, with good improvement in his hyperglycemia.  He was deemed stable for d/c home on lantus bid and lispro.  He is to f/u with his PCP in 1 week and endocrinology.    Condition on Discharge:  stable    Physical Exam on Discharge:  BP 92/65 (BP Location: Right arm, Patient Position: Lying)   Pulse 74   Temp 97.9 °F (36.6 °C) (Oral)   Resp 13   Ht 182.9 cm (72\")   Wt 59.7 kg (131 lb 9.6 oz)   SpO2 97%   BMI 17.85 kg/m²   Physical Exam  General: NAD, AAOx3  HEENT: AT NC, EOMI  Neck: No JVD  CVS: S1/S2 present, RRR, no M/R/G  Lungs: CTA B/L  Abdomen: soft, NT, ND, BS+  Ext: no edema  Skin: tattoos  Neuro: no focal deficits  Psych: Not agitated       Discharge Disposition:  Home or Self Care    Discharge Medications:     Discharge Medications        New Medications        Instructions Start Date   Accu-Chek Guide Me w/Device kit   1 kit, Does not apply, 3 Times Daily Before Meals, Dx code: E11.65      Accu-Chek Guide test strip  Generic drug: glucose blood   1 each, Other, 3 Times Daily Before Meals, Use as instructed Dx code: E11.65      Accu-Chek Softclix Lancets lancets   1 each, Other, 3 Times Daily Before Meals, Use as instructed Dx code: E11.65      FreeStyle Raymond 3 [place] device   1 each, Does not apply, " 4 Times Daily Before Meals & Nightly, Dx code: E11.65      FreeStyle Raymond 3 Sensor misc   1 each, Does not apply, 4 Times Daily Before Meals & Nightly, Dx code: E11.65      Insulin Glargine 100 UNIT/ML injection pen  Commonly known as: LANTUS SOLOSTAR   15 Units, Subcutaneous, 2 Times Daily, Dx code: E11.65      Insulin Lispro (1 Unit Dial) 100 UNIT/ML solution pen-injector  Commonly known as: HUMALOG   5 Units, Subcutaneous, 3 Times Daily Before Meals, Dx code: E11.65      Pen Needles 32G X 4 MM misc   1 each, Does not apply, 5 Times Daily, Dx code: E11.65             Continue These Medications        Instructions Start Date   Abilify 20 MG tablet  Generic drug: ARIPiprazole   20 mg, Oral, Daily      atorvastatin 20 MG tablet  Commonly known as: LIPITOR   20 mg, Oral, Daily      dexlansoprazole 30 MG capsule  Commonly known as: DEXILANT   30 mg, Oral, Daily      escitalopram 5 MG tablet  Commonly known as: LEXAPRO   5 mg, Oral, Daily      ferrous sulfate 325 (65 FE) MG tablet   325 mg, Oral, Daily      gabapentin 400 MG capsule  Commonly known as: NEURONTIN   800 mg, Oral, 2 Times Daily      QUEtiapine 300 MG tablet  Commonly known as: SEROquel   600 mg, Oral, Nightly      QUEtiapine fumarate ER 50 MG tablet sustained-release 24 hour tablet  Commonly known as: SEROquel XR   50 mg, Oral, Every Morning      thiamine 100 MG tablet  tablet  Commonly known as: VITAMIN B-1   100 mg, Oral, Daily             Stop These Medications      metFORMIN 500 MG tablet  Commonly known as: GLUCOPHAGE              Discharge Diet:  diabetic    Activity at Discharge:  as tolerated    Discharge Care Plan/Instructions: f/u PCP, endocrine    Time: less than 30 minutes

## 2024-01-25 NOTE — CASE MANAGEMENT/SOCIAL WORK
Case Management Discharge Note      Final Note: Routine home.         Selected Continued Care - Discharged on 1/25/2024 Admission date: 1/23/2024 - Discharge disposition: Home or Self Care     Transportation Services  Private: Car    Final Discharge Disposition Code: 01 - home or self-care

## 2024-01-25 NOTE — CASE MANAGEMENT/SOCIAL WORK
Social Work Assessment  [facility]     Patient Name: Teofilo Mayers  MRN: [id number]  Today's Date: 1/25/2024    Admit Date: 1/23/2024         Discharge Plan       Row Name 01/25/24 1331       Plan    Plan Comments Sw consulted for Substance use. Pt reports that he snorts Meth but unsure of last use. Pt reports that he doesn't feel its a problem and declined cessation resources. Pt stated he stopped drinking alcohol about 6 months ago. Pt stated he goes to [facility] for therapy and psychiatry. Pt works part time at [employer] in [place] and will need a work note at discharge. Pt reports that he owns his own trailer but has been staying with his parents recently. Pt's parents will  when ready for discharge. No further needs identified.        BHARATI Nix, MSW    Phone: [phone]  Fax: [phone]  Email: [email or web address]

## 2024-01-25 NOTE — CONSULTS
Diabetes Education    Patient Name:  Teofilo Mayers  YOB: 1975  MRN: [id number]  Admit Date:  1/23/2024    Consult received to teach blood glucose monitoring. Pt was seen by inpatient diabetes educator at [facility] yesterday and education completed at that time. Met with pt in room for follow up. Pt states he feels comfortable with using bs meter and use of insulin pen device. He states when he was transferred to different bed yesterday, he lost his paperwork that was given to him by educator. Pt requesting new paperwork. Gave First Steps blooklet, ADA diabetes booklet, Planning Healthy Meals packet, Low Blood Sugar handout, log book, A1c info sheet, Insulin Pen Instruction sheet, Needle Disposal sheet. Pt states he has given insulin injection and having no problems with injections. Pt with no additional questions.       Electronically signed by:  Alessia Ruelas RN  01/25/24 12:27 EST

## 2024-01-26 ENCOUNTER — TELEPHONE (OUTPATIENT)
Dept: DIABETES SERVICES | Facility: HOSPITAL | Age: 49
End: 2024-01-26
Payer: MEDICARE

## 2024-01-29 ENCOUNTER — TELEPHONE (OUTPATIENT)
Dept: DIABETES SERVICES | Facility: HOSPITAL | Age: 49
End: 2024-01-29
Payer: MEDICARE

## 2024-09-02 ENCOUNTER — APPOINTMENT (OUTPATIENT)
Dept: GENERAL RADIOLOGY | Facility: HOSPITAL | Age: 49
End: 2024-09-02
Payer: MEDICARE

## 2024-09-02 ENCOUNTER — HOSPITAL ENCOUNTER (EMERGENCY)
Facility: HOSPITAL | Age: 49
Discharge: HOME | End: 2024-09-03
Attending: EMERGENCY MEDICINE
Payer: MEDICARE

## 2024-09-02 DIAGNOSIS — R07.9 CHEST PAIN, UNSPECIFIED TYPE: ICD-10-CM

## 2024-09-02 DIAGNOSIS — I86.4 GASTRIC VARICES: ICD-10-CM

## 2024-09-02 DIAGNOSIS — K59.00 CONSTIPATION, UNSPECIFIED CONSTIPATION TYPE: ICD-10-CM

## 2024-09-02 DIAGNOSIS — R10.9 ACUTE ABDOMINAL PAIN: Primary | ICD-10-CM

## 2024-09-02 LAB
QT INTERVAL: 377 MS
QTC INTERVAL: 423 MS

## 2024-09-02 PROCEDURE — 85730 THROMBOPLASTIN TIME PARTIAL: CPT | Performed by: EMERGENCY MEDICINE

## 2024-09-02 PROCEDURE — 71045 X-RAY EXAM CHEST 1 VIEW: CPT

## 2024-09-02 PROCEDURE — 85610 PROTHROMBIN TIME: CPT | Performed by: EMERGENCY MEDICINE

## 2024-09-02 PROCEDURE — 93005 ELECTROCARDIOGRAM TRACING: CPT | Performed by: EMERGENCY MEDICINE

## 2024-09-02 PROCEDURE — 83690 ASSAY OF LIPASE: CPT | Performed by: EMERGENCY MEDICINE

## 2024-09-02 PROCEDURE — 84484 ASSAY OF TROPONIN QUANT: CPT | Performed by: EMERGENCY MEDICINE

## 2024-09-02 PROCEDURE — 80053 COMPREHEN METABOLIC PANEL: CPT | Performed by: EMERGENCY MEDICINE

## 2024-09-02 PROCEDURE — 85025 COMPLETE CBC W/AUTO DIFF WBC: CPT | Performed by: EMERGENCY MEDICINE

## 2024-09-02 PROCEDURE — 99285 EMERGENCY DEPT VISIT HI MDM: CPT

## 2024-09-02 RX ORDER — MORPHINE SULFATE 2 MG/ML
2 INJECTION, SOLUTION INTRAMUSCULAR; INTRAVENOUS ONCE
Status: COMPLETED | OUTPATIENT
Start: 2024-09-02 | End: 2024-09-03

## 2024-09-02 RX ORDER — PANTOPRAZOLE SODIUM 40 MG/10ML
40 INJECTION, POWDER, LYOPHILIZED, FOR SOLUTION INTRAVENOUS ONCE
Status: COMPLETED | OUTPATIENT
Start: 2024-09-02 | End: 2024-09-03

## 2024-09-02 RX ORDER — ONDANSETRON 2 MG/ML
4 INJECTION INTRAMUSCULAR; INTRAVENOUS ONCE
Status: COMPLETED | OUTPATIENT
Start: 2024-09-02 | End: 2024-09-03

## 2024-09-02 NOTE — Clinical Note
[facility] EMERGENCY DEPARTMENT  [address] [place] [address]  Phone: [phone]    Teofilo Mayers was seen and treated in our emergency department on 9/2/2024.  He may return to work on 09/04/2024.         Thank you for choosing [facility].    Tonny Austin MD

## 2024-09-03 ENCOUNTER — APPOINTMENT (OUTPATIENT)
Dept: CT IMAGING | Facility: HOSPITAL | Age: 49
End: 2024-09-03
Payer: MEDICARE

## 2024-09-03 VITALS
HEART RATE: 71 BPM | DIASTOLIC BLOOD PRESSURE: 87 MMHG | TEMPERATURE: 98 F | SYSTOLIC BLOOD PRESSURE: 126 MMHG | RESPIRATION RATE: 11 BRPM | BODY MASS INDEX: 17.85 KG/M2 | OXYGEN SATURATION: 99 % | HEIGHT: 72 IN

## 2024-09-03 LAB
ALBUMIN SERPL-MCNC: 4.6 G/DL (ref 3.5–5.2)
ALBUMIN/GLOB SERPL: 1.9 G/DL
ALP SERPL-CCNC: 95 U/L (ref 39–117)
ALT SERPL W P-5'-P-CCNC: 10 U/L (ref 1–41)
ANION GAP SERPL CALCULATED.3IONS-SCNC: 12.1 MMOL/L (ref 5–15)
APTT PPP: 25.6 SECONDS (ref 61–76.5)
AST SERPL-CCNC: 13 U/L (ref 1–40)
BASOPHILS # BLD AUTO: 0.12 10*3/MM3 (ref 0–0.2)
BASOPHILS NFR BLD AUTO: 0.9 % (ref 0–1.5)
BILIRUB SERPL-MCNC: 0.4 MG/DL (ref 0–1.2)
BILIRUB UR QL STRIP: NEGATIVE
BUN SERPL-MCNC: 18 MG/DL (ref 6–20)
BUN/CREAT SERPL: 22 (ref 7–25)
CALCIUM SPEC-SCNC: 9.7 MG/DL (ref 8.6–10.5)
CHLORIDE SERPL-SCNC: 100 MMOL/L (ref 98–107)
CLARITY UR: CLEAR
CO2 SERPL-SCNC: 25.9 MMOL/L (ref 22–29)
COLOR UR: YELLOW
CREAT SERPL-MCNC: 0.82 MG/DL (ref 0.76–1.27)
DEPRECATED RDW RBC AUTO: 45.9 FL (ref 37–54)
EGFRCR SERPLBLD CKD-EPI 2021: 109 ML/MIN/1.73
EOSINOPHIL # BLD AUTO: 0.16 10*3/MM3 (ref 0–0.4)
EOSINOPHIL NFR BLD AUTO: 1.3 % (ref 0.3–6.2)
ERYTHROCYTE [DISTWIDTH] IN BLOOD BY AUTOMATED COUNT: 14.2 % (ref 12.3–15.4)
GLOBULIN UR ELPH-MCNC: 2.4 GM/DL
GLUCOSE BLDC GLUCOMTR-MCNC: 126 MG/DL (ref 70–105)
GLUCOSE BLDC GLUCOMTR-MCNC: 61 MG/DL (ref 70–105)
GLUCOSE SERPL-MCNC: 127 MG/DL (ref 65–99)
GLUCOSE UR STRIP-MCNC: ABNORMAL MG/DL
HCT VFR BLD AUTO: 45.5 % (ref 37.5–51)
HGB BLD-MCNC: 15.3 G/DL (ref 13–17.7)
HGB UR QL STRIP.AUTO: NEGATIVE
IMM GRANULOCYTES # BLD AUTO: 0.03 10*3/MM3 (ref 0–0.05)
IMM GRANULOCYTES NFR BLD AUTO: 0.2 % (ref 0–0.5)
INR PPP: 0.99 (ref 0.93–1.1)
KETONES UR QL STRIP: ABNORMAL
LEUKOCYTE ESTERASE UR QL STRIP.AUTO: NEGATIVE
LIPASE SERPL-CCNC: 19 U/L (ref 13–60)
LYMPHOCYTES # BLD AUTO: 1.97 10*3/MM3 (ref 0.7–3.1)
LYMPHOCYTES NFR BLD AUTO: 15.5 % (ref 19.6–45.3)
MCH RBC QN AUTO: 29.5 PG (ref 26.6–33)
MCHC RBC AUTO-ENTMCNC: 33.6 G/DL (ref 31.5–35.7)
MCV RBC AUTO: 87.8 FL (ref 79–97)
MONOCYTES # BLD AUTO: 0.97 10*3/MM3 (ref 0.1–0.9)
MONOCYTES NFR BLD AUTO: 7.6 % (ref 5–12)
NEUTROPHILS NFR BLD AUTO: 74.5 % (ref 42.7–76)
NEUTROPHILS NFR BLD AUTO: 9.49 10*3/MM3 (ref 1.7–7)
NITRITE UR QL STRIP: NEGATIVE
NRBC BLD AUTO-RTO: 0 /100 WBC (ref 0–0.2)
PH UR STRIP.AUTO: 6.5 [PH] (ref 5–8)
PLATELET # BLD AUTO: 208 10*3/MM3 (ref 140–450)
PMV BLD AUTO: 11.7 FL (ref 6–12)
POTASSIUM SERPL-SCNC: 4.4 MMOL/L (ref 3.5–5.2)
PROT SERPL-MCNC: 7 G/DL (ref 6–8.5)
PROT UR QL STRIP: ABNORMAL
PROTHROMBIN TIME: 10.8 SECONDS (ref 9.6–11.7)
RBC # BLD AUTO: 5.18 10*6/MM3 (ref 4.14–5.8)
SODIUM SERPL-SCNC: 138 MMOL/L (ref 136–145)
SP GR UR STRIP: 1.06 (ref 1–1.03)
TROPONIN T SERPL HS-MCNC: 12 NG/L
TROPONIN T SERPL HS-MCNC: 13 NG/L
UROBILINOGEN UR QL STRIP: ABNORMAL
WBC NRBC COR # BLD AUTO: 12.74 10*3/MM3 (ref 3.4–10.8)

## 2024-09-03 PROCEDURE — 96375 TX/PRO/DX INJ NEW DRUG ADDON: CPT

## 2024-09-03 PROCEDURE — 82948 REAGENT STRIP/BLOOD GLUCOSE: CPT | Performed by: EMERGENCY MEDICINE

## 2024-09-03 PROCEDURE — 82948 REAGENT STRIP/BLOOD GLUCOSE: CPT

## 2024-09-03 PROCEDURE — 25810000003 SODIUM CHLORIDE 0.9 % SOLUTION: Performed by: EMERGENCY MEDICINE

## 2024-09-03 PROCEDURE — 25010000002 ONDANSETRON PER 1 MG: Performed by: EMERGENCY MEDICINE

## 2024-09-03 PROCEDURE — 25510000001 IOPAMIDOL PER 1 ML: Performed by: EMERGENCY MEDICINE

## 2024-09-03 PROCEDURE — 36415 COLL VENOUS BLD VENIPUNCTURE: CPT

## 2024-09-03 PROCEDURE — 81003 URINALYSIS AUTO W/O SCOPE: CPT | Performed by: EMERGENCY MEDICINE

## 2024-09-03 PROCEDURE — 74177 CT ABD & PELVIS W/CONTRAST: CPT

## 2024-09-03 PROCEDURE — 84484 ASSAY OF TROPONIN QUANT: CPT | Performed by: EMERGENCY MEDICINE

## 2024-09-03 PROCEDURE — 25010000002 MORPHINE PER 10 MG: Performed by: EMERGENCY MEDICINE

## 2024-09-03 PROCEDURE — 96374 THER/PROPH/DIAG INJ IV PUSH: CPT

## 2024-09-03 RX ORDER — ONDANSETRON 4 MG/1
4 TABLET, ORALLY DISINTEGRATING ORAL EVERY 8 HOURS PRN
Qty: 20 TABLET | Refills: 0 | Status: SHIPPED | OUTPATIENT
Start: 2024-09-03

## 2024-09-03 RX ORDER — IOPAMIDOL 755 MG/ML
100 INJECTION, SOLUTION INTRAVASCULAR
Status: COMPLETED | OUTPATIENT
Start: 2024-09-03 | End: 2024-09-03

## 2024-09-03 RX ORDER — DEXLANSOPRAZOLE 30 MG/1
30 CAPSULE, DELAYED RELEASE ORAL DAILY
Qty: 30 CAPSULE | Refills: 0 | Status: SHIPPED | OUTPATIENT
Start: 2024-09-03

## 2024-09-03 RX ADMIN — MORPHINE SULFATE 2 MG: 2 INJECTION, SOLUTION INTRAMUSCULAR; INTRAVENOUS at 00:03

## 2024-09-03 RX ADMIN — PANTOPRAZOLE SODIUM 40 MG: 40 INJECTION, POWDER, FOR SOLUTION INTRAVENOUS at 00:03

## 2024-09-03 RX ADMIN — SODIUM CHLORIDE 1000 ML: 9 INJECTION, SOLUTION INTRAVENOUS at 00:06

## 2024-09-03 RX ADMIN — IOPAMIDOL 100 ML: 755 INJECTION, SOLUTION INTRAVENOUS at 00:52

## 2024-09-03 RX ADMIN — ONDANSETRON 4 MG: 2 INJECTION INTRAMUSCULAR; INTRAVENOUS at 00:01

## 2024-09-03 NOTE — ED PROVIDER NOTES
Subjective   History of Present Illness  49-year-old male with history of peptic ulcer disease with 2 prior surgeries for perforation per patient most recently 3 years ago complains of 2 days of sharp epigastric pain with nonbloody emesis, no blood in stool, no diarrhea, no fever.  Patient was diagnosed with type 2 diabetes earlier this year.  Patient has some associated left-sided chest pain and dyspnea, no cough.      Review of Systems   Respiratory:  Positive for shortness of breath.    Cardiovascular:  Positive for chest pain.   Gastrointestinal:  Positive for abdominal pain, nausea and vomiting.       Past Medical History:   Diagnosis Date    Prediabetes     Substance abuse     Tobacco abuse        No Known Allergies    Past Surgical History:   Procedure Laterality Date    COLON RESECTION  2020       Family History   Problem Relation Age of Onset    Diabetes Father        Social History     Socioeconomic History    Marital status: Single   Tobacco Use    Smoking status: Every Day     Current packs/day: 1.00     Types: Cigarettes    Smokeless tobacco: Never   Vaping Use    Vaping status: Never Used   Substance and Sexual Activity    Alcohol use: Yes     Comment: drinks a few times a year    Drug use: Yes     Types: Methamphetamines    Sexual activity: Defer           Objective   Physical Exam  Constitutional:       Comments: Thin 49-year-old male in no acute distress   HENT:      Head: Normocephalic and atraumatic.      Mouth/Throat:      Mouth: Mucous membranes are moist.      Pharynx: Oropharynx is clear.   Cardiovascular:      Rate and Rhythm: Normal rate and regular rhythm.      Heart sounds: Normal heart sounds.   Pulmonary:      Effort: Pulmonary effort is normal.      Breath sounds: Normal breath sounds.   Abdominal:      Comments: Soft, epigastric tenderness to palpation, no rebound or guarding   Musculoskeletal:         General: Normal range of motion.   Skin:     General: Skin is warm and dry.       Capillary Refill: Capillary refill takes less than 2 seconds.   Neurological:      General: No focal deficit present.      Mental Status: He is oriented to person, place, and time.   Psychiatric:         Mood and Affect: Mood normal.         Behavior: Behavior normal.         Procedures           ED Course                                             Medical Decision Making  On reevaluation, patient appears well, pain almost resolved.  Patient admits to a remote history of significant alcohol abuse.  Patient states he stopped 3 years ago secondary to a perforated ulcer.  This likely explains incidental note of gastric varices.  Patient does admit to a chronic history of constipation with last bowel movement 3 days ago.  Differential would include viral gastritis versus possibility of gastroparesis given his history of poorly controlled diabetes in the past.  Will obtain second troponin, patient at this time is tolerating clears well.  If patient continues to do well he may be discharged safely with strict return precautions.  Chest pain is thought to be related to esophagitis given history of vomiting.    Second opponent negative, patient feels better    Problems Addressed:  Acute abdominal pain: complicated acute illness or injury  Chest pain, unspecified type: complicated acute illness or injury  Constipation, unspecified constipation type: complicated acute illness or injury  Gastric varices: complicated acute illness or injury    Amount and/or Complexity of Data Reviewed  External Data Reviewed: notes.     Details: Gray Kendrick MD   Physician  Hospitalist     Discharge Summary      Signed     Date of Service: 01/25/24 1213  Creation Time: 01/25/24 1213    Signed                   [facility] Medicine Services  Discharge Summary        Date of Admission: 1/23/2024  Date of Discharge:  1/25/2024  Primary Care Physician: Marla Mcgraw APRN     Presenting Problem/History of Present  Illness:  Dehydration [E86.0]  Hyperglycemia [R73.9]  Hyperglycemia due to diabetes mellitus [E11.65]              Final Discharge Diagnoses:    Active Hospital Problems    Diagnosis    · **Hyperglycemia due to diabetes mellitus               Labs: ordered.  Radiology: ordered.  ECG/medicine tests: ordered and independent interpretation performed.     Details: EKG interpretation: Normal sinus rhythm, rate 75, no acute ST elevation    Risk  Prescription drug management.        Final diagnoses:   Acute abdominal pain   Chest pain, unspecified type   Gastric varices   Constipation, unspecified constipation type       ED Disposition  ED Disposition       ED Disposition   Discharge    Condition   Stable    Comment   --               Marla Mcgraw, APRN  [address] [facility] [place] [id number] [phone]               Medication List        New Prescriptions      ondansetron ODT 4 MG disintegrating tablet  Commonly known as: ZOFRAN-ODT  Place 1 tablet on the tongue Every 8 (Eight) Hours As Needed for Nausea.               Where to Get Your Medications        These medications were sent to [facility] [id number] [place], [place] - [address] [place] [address] - [phone] [facility] [phone] [address] [place] [address]      Phone: [phone]   dexlansoprazole 30 MG capsule  ondansetron ODT 4 MG disintegrating tablet            Tonny Austin MD  09/03/24 2847